# Patient Record
Sex: FEMALE | Race: WHITE
[De-identification: names, ages, dates, MRNs, and addresses within clinical notes are randomized per-mention and may not be internally consistent; named-entity substitution may affect disease eponyms.]

---

## 2018-01-12 ENCOUNTER — RESULT REVIEW (OUTPATIENT)
Age: 83
End: 2018-01-12

## 2019-04-09 ENCOUNTER — RESULT REVIEW (OUTPATIENT)
Age: 84
End: 2019-04-09

## 2019-04-17 ENCOUNTER — RESULT REVIEW (OUTPATIENT)
Age: 84
End: 2019-04-17

## 2021-10-21 PROBLEM — Z00.00 ENCOUNTER FOR PREVENTIVE HEALTH EXAMINATION: Status: ACTIVE | Noted: 2021-10-21

## 2021-10-29 ENCOUNTER — APPOINTMENT (OUTPATIENT)
Dept: OTOLARYNGOLOGY | Facility: CLINIC | Age: 86
End: 2021-10-29
Payer: MEDICARE

## 2021-10-29 VITALS — HEIGHT: 67 IN | WEIGHT: 165 LBS | BODY MASS INDEX: 25.9 KG/M2

## 2021-10-29 DIAGNOSIS — H90.3 SENSORINEURAL HEARING LOSS, BILATERAL: ICD-10-CM

## 2021-10-29 DIAGNOSIS — H93.8X9 OTHER SPECIFIED DISORDERS OF EAR, UNSPECIFIED EAR: ICD-10-CM

## 2021-10-29 PROCEDURE — 92550 TYMPANOMETRY & REFLEX THRESH: CPT

## 2021-10-29 PROCEDURE — 99203 OFFICE O/P NEW LOW 30 MIN: CPT | Mod: 25

## 2021-10-29 PROCEDURE — 92557 COMPREHENSIVE HEARING TEST: CPT

## 2021-10-29 NOTE — PHYSICAL EXAM
[Midline] : trachea located in midline position [Normal] : no rashes [de-identified] : narrow canals bilaterally

## 2021-10-29 NOTE — HISTORY OF PRESENT ILLNESS
[de-identified] : Patient presents today with c/o hearing loss. Patient admits lost hearing in the right ear [place]. Patient admits hearing loss worsening in the left ear after taking flight in September 2021. Patient very frustrated that she cant hear people anymore or understand them. 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 She tried hearing aid but did not help her.

## 2021-10-29 NOTE — ASSESSMENT
[FreeTextEntry1] : I reviewed, interpreted, and discussed the Audiogram done today. Bilateral SNHL. \par

## 2021-11-02 ENCOUNTER — APPOINTMENT (OUTPATIENT)
Dept: OTOLARYNGOLOGY | Facility: CLINIC | Age: 86
End: 2021-11-02
Payer: SELF-PAY

## 2021-11-02 PROCEDURE — V5299A: CUSTOM | Mod: NC

## 2022-03-17 ENCOUNTER — APPOINTMENT (OUTPATIENT)
Dept: OTOLARYNGOLOGY | Facility: CLINIC | Age: 87
End: 2022-03-17
Payer: SELF-PAY

## 2022-03-17 PROCEDURE — V5010 ASSESSMENT FOR HEARING AID: CPT

## 2022-03-23 ENCOUNTER — APPOINTMENT (OUTPATIENT)
Dept: OTOLARYNGOLOGY | Facility: CLINIC | Age: 87
End: 2022-03-23
Payer: MEDICARE

## 2022-03-23 PROCEDURE — V5010 ASSESSMENT FOR HEARING AID: CPT

## 2022-03-23 PROCEDURE — V5261G: CUSTOM

## 2022-04-22 ENCOUNTER — APPOINTMENT (OUTPATIENT)
Dept: OTOLARYNGOLOGY | Facility: CLINIC | Age: 87
End: 2022-04-22
Payer: SELF-PAY

## 2022-04-22 PROCEDURE — V5299A: CUSTOM | Mod: NC

## 2022-11-04 ENCOUNTER — APPOINTMENT (OUTPATIENT)
Dept: OTOLARYNGOLOGY | Facility: CLINIC | Age: 87
End: 2022-11-04

## 2022-11-04 PROCEDURE — V5299A: CUSTOM | Mod: NC

## 2022-11-20 ENCOUNTER — EMERGENCY (EMERGENCY)
Facility: HOSPITAL | Age: 87
LOS: 0 days | Discharge: HOME | End: 2022-11-20
Attending: EMERGENCY MEDICINE | Admitting: EMERGENCY MEDICINE

## 2022-11-20 VITALS
OXYGEN SATURATION: 99 % | HEART RATE: 66 BPM | TEMPERATURE: 98 F | SYSTOLIC BLOOD PRESSURE: 182 MMHG | DIASTOLIC BLOOD PRESSURE: 87 MMHG | RESPIRATION RATE: 18 BRPM

## 2022-11-20 DIAGNOSIS — R35.0 FREQUENCY OF MICTURITION: ICD-10-CM

## 2022-11-20 DIAGNOSIS — E78.5 HYPERLIPIDEMIA, UNSPECIFIED: ICD-10-CM

## 2022-11-20 DIAGNOSIS — R10.30 LOWER ABDOMINAL PAIN, UNSPECIFIED: ICD-10-CM

## 2022-11-20 DIAGNOSIS — N39.0 URINARY TRACT INFECTION, SITE NOT SPECIFIED: ICD-10-CM

## 2022-11-20 DIAGNOSIS — I10 ESSENTIAL (PRIMARY) HYPERTENSION: ICD-10-CM

## 2022-11-20 DIAGNOSIS — R30.0 DYSURIA: ICD-10-CM

## 2022-11-20 LAB
ALBUMIN SERPL ELPH-MCNC: 4 G/DL — SIGNIFICANT CHANGE UP (ref 3.5–5.2)
ALP SERPL-CCNC: 81 U/L — SIGNIFICANT CHANGE UP (ref 30–115)
ALT FLD-CCNC: 13 U/L — SIGNIFICANT CHANGE UP (ref 0–41)
ANION GAP SERPL CALC-SCNC: 11 MMOL/L — SIGNIFICANT CHANGE UP (ref 7–14)
APPEARANCE UR: ABNORMAL
AST SERPL-CCNC: 19 U/L — SIGNIFICANT CHANGE UP (ref 0–41)
BACTERIA # UR AUTO: ABNORMAL
BASOPHILS # BLD AUTO: 0.04 K/UL — SIGNIFICANT CHANGE UP (ref 0–0.2)
BASOPHILS NFR BLD AUTO: 0.5 % — SIGNIFICANT CHANGE UP (ref 0–1)
BILIRUB SERPL-MCNC: 0.4 MG/DL — SIGNIFICANT CHANGE UP (ref 0.2–1.2)
BILIRUB UR-MCNC: NEGATIVE — SIGNIFICANT CHANGE UP
BUN SERPL-MCNC: 14 MG/DL — SIGNIFICANT CHANGE UP (ref 10–20)
CALCIUM SERPL-MCNC: 9.7 MG/DL — SIGNIFICANT CHANGE UP (ref 8.4–10.5)
CHLORIDE SERPL-SCNC: 109 MMOL/L — SIGNIFICANT CHANGE UP (ref 98–110)
CO2 SERPL-SCNC: 27 MMOL/L — SIGNIFICANT CHANGE UP (ref 17–32)
COLOR SPEC: YELLOW — SIGNIFICANT CHANGE UP
CREAT SERPL-MCNC: 0.9 MG/DL — SIGNIFICANT CHANGE UP (ref 0.7–1.5)
DIFF PNL FLD: ABNORMAL
EGFR: 63 ML/MIN/1.73M2 — SIGNIFICANT CHANGE UP
EOSINOPHIL # BLD AUTO: 0.13 K/UL — SIGNIFICANT CHANGE UP (ref 0–0.7)
EOSINOPHIL NFR BLD AUTO: 1.6 % — SIGNIFICANT CHANGE UP (ref 0–8)
EPI CELLS # UR: 0 /HPF — SIGNIFICANT CHANGE UP (ref 0–5)
GLUCOSE SERPL-MCNC: 91 MG/DL — SIGNIFICANT CHANGE UP (ref 70–99)
GLUCOSE UR QL: NEGATIVE — SIGNIFICANT CHANGE UP
HCT VFR BLD CALC: 44.8 % — SIGNIFICANT CHANGE UP (ref 37–47)
HGB BLD-MCNC: 14.6 G/DL — SIGNIFICANT CHANGE UP (ref 12–16)
HYALINE CASTS # UR AUTO: 2 /LPF — SIGNIFICANT CHANGE UP (ref 0–7)
IMM GRANULOCYTES NFR BLD AUTO: 0.2 % — SIGNIFICANT CHANGE UP (ref 0.1–0.3)
KETONES UR-MCNC: NEGATIVE — SIGNIFICANT CHANGE UP
LEUKOCYTE ESTERASE UR-ACNC: ABNORMAL
LYMPHOCYTES # BLD AUTO: 1.9 K/UL — SIGNIFICANT CHANGE UP (ref 1.2–3.4)
LYMPHOCYTES # BLD AUTO: 22.9 % — SIGNIFICANT CHANGE UP (ref 20.5–51.1)
MCHC RBC-ENTMCNC: 30.2 PG — SIGNIFICANT CHANGE UP (ref 27–31)
MCHC RBC-ENTMCNC: 32.6 G/DL — SIGNIFICANT CHANGE UP (ref 32–37)
MCV RBC AUTO: 92.6 FL — SIGNIFICANT CHANGE UP (ref 81–99)
MONOCYTES # BLD AUTO: 0.63 K/UL — HIGH (ref 0.1–0.6)
MONOCYTES NFR BLD AUTO: 7.6 % — SIGNIFICANT CHANGE UP (ref 1.7–9.3)
NEUTROPHILS # BLD AUTO: 5.59 K/UL — SIGNIFICANT CHANGE UP (ref 1.4–6.5)
NEUTROPHILS NFR BLD AUTO: 67.2 % — SIGNIFICANT CHANGE UP (ref 42.2–75.2)
NITRITE UR-MCNC: POSITIVE
NRBC # BLD: 0 /100 WBCS — SIGNIFICANT CHANGE UP (ref 0–0)
PH UR: 7.5 — SIGNIFICANT CHANGE UP (ref 5–8)
PLATELET # BLD AUTO: 168 K/UL — SIGNIFICANT CHANGE UP (ref 130–400)
POTASSIUM SERPL-MCNC: 5.6 MMOL/L — HIGH (ref 3.5–5)
POTASSIUM SERPL-SCNC: 5.6 MMOL/L — HIGH (ref 3.5–5)
PROT SERPL-MCNC: 7.1 G/DL — SIGNIFICANT CHANGE UP (ref 6–8)
PROT UR-MCNC: SIGNIFICANT CHANGE UP
RBC # BLD: 4.84 M/UL — SIGNIFICANT CHANGE UP (ref 4.2–5.4)
RBC # FLD: 13.4 % — SIGNIFICANT CHANGE UP (ref 11.5–14.5)
RBC CASTS # UR COMP ASSIST: 7 /HPF — HIGH (ref 0–4)
SODIUM SERPL-SCNC: 147 MMOL/L — HIGH (ref 135–146)
SP GR SPEC: 1.01 — LOW (ref 1.01–1.03)
UROBILINOGEN FLD QL: SIGNIFICANT CHANGE UP
WBC # BLD: 8.31 K/UL — SIGNIFICANT CHANGE UP (ref 4.8–10.8)
WBC # FLD AUTO: 8.31 K/UL — SIGNIFICANT CHANGE UP (ref 4.8–10.8)
WBC UR QL: >720 /HPF — HIGH (ref 0–5)

## 2022-11-20 PROCEDURE — 74177 CT ABD & PELVIS W/CONTRAST: CPT | Mod: 26,MA

## 2022-11-20 PROCEDURE — 99285 EMERGENCY DEPT VISIT HI MDM: CPT

## 2022-11-20 RX ORDER — SODIUM CHLORIDE 9 MG/ML
1000 INJECTION, SOLUTION INTRAVENOUS ONCE
Refills: 0 | Status: COMPLETED | OUTPATIENT
Start: 2022-11-20 | End: 2022-11-20

## 2022-11-20 RX ORDER — CEFTRIAXONE 500 MG/1
1000 INJECTION, POWDER, FOR SOLUTION INTRAMUSCULAR; INTRAVENOUS ONCE
Refills: 0 | Status: COMPLETED | OUTPATIENT
Start: 2022-11-20 | End: 2022-11-20

## 2022-11-20 RX ORDER — ACETAMINOPHEN 500 MG
650 TABLET ORAL ONCE
Refills: 0 | Status: COMPLETED | OUTPATIENT
Start: 2022-11-20 | End: 2022-11-20

## 2022-11-20 RX ORDER — CEFPODOXIME PROXETIL 100 MG
1 TABLET ORAL
Qty: 14 | Refills: 0
Start: 2022-11-20 | End: 2022-11-26

## 2022-11-20 RX ADMIN — SODIUM CHLORIDE 1000 MILLILITER(S): 9 INJECTION, SOLUTION INTRAVENOUS at 13:39

## 2022-11-20 RX ADMIN — Medication 650 MILLIGRAM(S): at 13:39

## 2022-11-20 RX ADMIN — CEFTRIAXONE 100 MILLIGRAM(S): 500 INJECTION, POWDER, FOR SOLUTION INTRAMUSCULAR; INTRAVENOUS at 16:34

## 2022-11-20 NOTE — ED PROVIDER NOTE - PHYSICAL EXAMINATION
CONSTITUTIONAL: Well-developed; well-nourished; in no acute distress.   SKIN: warm, dry  HEAD: Normocephalic  EYES: no conjunctival injection.  CARD: S1, S2 normal; Regular rate and rhythm.   RESP: No wheezes, rales or rhonchi.  ABD: soft nd, mild suprapubic tenderness, no cva tenderness  EXT: Normal ROM.  No clubbing, cyanosis or edema.   NEURO: Alert, oriented, grossly unremarkable.  PSYCH: Cooperative, appropriate.

## 2022-11-20 NOTE — ED PROVIDER NOTE - PATIENT PORTAL LINK FT
You can access the FollowMyHealth Patient Portal offered by [facility] by registering at the following website: [email or web address]. By joining [employer]’s FollowMyHealth portal, you will also be able to view your health information using other applications (apps) compatible with our system.

## 2022-11-20 NOTE — ED PROVIDER NOTE - NS ED ROS FT
GEN:  no fever, no chills, no generalized weakness  NEURO:  no headache, no dizziness  ENT: no sore throat, no runny nose  CV:  no chest pain, no palpitations  RESP:  no sob, no cough  GI:  no nausea, no vomiting, +suprapubic and left flank pain, no diarrhea  :  +dysuria, +urinary frequency, no hematuria  MSK:  no joint pain, no edema  SKIN:  no rash, no bruising

## 2022-11-20 NOTE — ED PROVIDER NOTE - CLINICAL SUMMARY MEDICAL DECISION MAKING FREE TEXT BOX
Patient found to have UTI versus pyelonephritis, will DC home with p.o. antibiotics, patient afebrile tolerating p.o. well appearing, follow-up with PMD as outpatient 1 to 2 weeks, strict return precautions

## 2022-11-20 NOTE — ED ADULT NURSE NOTE - OBJECTIVE STATEMENT
87 year old female alert and oriented c/o lower abdominal pain and flank pain for last few weeks that has been getting worse, patient had sono out patient that showed gallstones, denies fevers, n/v/d. No s.s of distress noted.

## 2022-11-20 NOTE — ED PROVIDER NOTE - OBJECTIVE STATEMENT
88 yo female, PMHx of HTN, HLD, presents with dysuria x1 month, worse over time, no alleviating factors now associated with radiating pain suprapubic to left flank. Denies fevers, chills, nausea, vomiting, headache, dizziness, diarrhea, constipation.

## 2022-11-20 NOTE — ED PROVIDER NOTE - ATTENDING CONTRIBUTION TO CARE
87-year-old female past medical history hypertension hyperlipidemia, presents with 1 month of dysuria and frequency.  Had suprapubic discomfort associated which now radiates to the left flank since yesterday.  No hematuria.  No fever.  No nausea vomiting diarrhea.  No chest pain shortness of breath.    On exam, AFVSS, Well appearing, No acute distress, NCAT, EOMI, PERRLA, MMM, Neck supple, LCTAB, RRR nl s1s2 No mrg, Abdomen Soft NTND, left CVA tenderness, AAOx3, No Focal Deficits, No LE edema or calf TTP,    A/P; concern for UTI/Pyelo, rule out renal colic, labs UA CT reeval

## 2022-11-20 NOTE — ED PROVIDER NOTE - CARE PROVIDER_API CALL
Patient presents with c/o cough, congestion and upset stomach for \"quite awhile\".  States she has been tested for COVID and it was negative.  No fevers noted.   MICAH WILLIAMSON  Internal Medicine  [address] [place] [address]  Phone: [phone]  Fax: [phone]  Follow Up Time: 1-3 Days

## 2022-11-23 LAB
-  AMIKACIN: SIGNIFICANT CHANGE UP
-  AMOXICILLIN/CLAVULANIC ACID: SIGNIFICANT CHANGE UP
-  AMPICILLIN/SULBACTAM: SIGNIFICANT CHANGE UP
-  AMPICILLIN: SIGNIFICANT CHANGE UP
-  AZTREONAM: SIGNIFICANT CHANGE UP
-  CEFAZOLIN: SIGNIFICANT CHANGE UP
-  CEFEPIME: SIGNIFICANT CHANGE UP
-  CEFOXITIN: SIGNIFICANT CHANGE UP
-  CEFTRIAXONE: SIGNIFICANT CHANGE UP
-  CIPROFLOXACIN: SIGNIFICANT CHANGE UP
-  ERTAPENEM: SIGNIFICANT CHANGE UP
-  GENTAMICIN: SIGNIFICANT CHANGE UP
-  IMIPENEM: SIGNIFICANT CHANGE UP
-  LEVOFLOXACIN: SIGNIFICANT CHANGE UP
-  MEROPENEM: SIGNIFICANT CHANGE UP
-  NITROFURANTOIN: SIGNIFICANT CHANGE UP
-  PIPERACILLIN/TAZOBACTAM: SIGNIFICANT CHANGE UP
-  TOBRAMYCIN: SIGNIFICANT CHANGE UP
-  TRIMETHOPRIM/SULFAMETHOXAZOLE: SIGNIFICANT CHANGE UP
CULTURE RESULTS: SIGNIFICANT CHANGE UP
METHOD TYPE: SIGNIFICANT CHANGE UP
ORGANISM # SPEC MICROSCOPIC CNT: SIGNIFICANT CHANGE UP
ORGANISM # SPEC MICROSCOPIC CNT: SIGNIFICANT CHANGE UP
SPECIMEN SOURCE: SIGNIFICANT CHANGE UP

## 2022-12-20 ENCOUNTER — APPOINTMENT (OUTPATIENT)
Dept: UROLOGY | Facility: CLINIC | Age: 87
End: 2022-12-20

## 2022-12-20 VITALS — WEIGHT: 150 LBS | BODY MASS INDEX: 24.99 KG/M2 | HEIGHT: 65 IN

## 2022-12-20 DIAGNOSIS — E78.00 PURE HYPERCHOLESTEROLEMIA, UNSPECIFIED: ICD-10-CM

## 2022-12-20 DIAGNOSIS — I10 ESSENTIAL (PRIMARY) HYPERTENSION: ICD-10-CM

## 2022-12-20 DIAGNOSIS — N28.1 CYST OF KIDNEY, ACQUIRED: ICD-10-CM

## 2022-12-20 DIAGNOSIS — Z78.9 OTHER SPECIFIED HEALTH STATUS: ICD-10-CM

## 2022-12-20 LAB
BILIRUB UR QL STRIP: NORMAL
COLLECTION METHOD: NORMAL
GLUCOSE UR-MCNC: NORMAL
HCG UR QL: 0.2 EU/DL
HGB UR QL STRIP.AUTO: NORMAL
KETONES UR-MCNC: NORMAL
LEUKOCYTE ESTERASE UR QL STRIP: NORMAL
NITRITE UR QL STRIP: NORMAL
PH UR STRIP: 5.5
PROT UR STRIP-MCNC: NORMAL
SP GR UR STRIP: 1.02

## 2022-12-20 PROCEDURE — 81003 URINALYSIS AUTO W/O SCOPE: CPT | Mod: QW

## 2022-12-20 PROCEDURE — 99203 OFFICE O/P NEW LOW 30 MIN: CPT

## 2022-12-20 RX ORDER — PRAVASTATIN SODIUM 10 MG/1
10 TABLET ORAL
Refills: 0 | Status: ACTIVE | COMMUNITY

## 2022-12-20 RX ORDER — METOPROLOL TARTRATE 75 MG/1
TABLET, FILM COATED ORAL
Refills: 0 | Status: ACTIVE | COMMUNITY

## 2022-12-20 NOTE — ASSESSMENT
[FreeTextEntry1] : 86 y/o female who presents with bilateral renal cysts on US. I have reviewed the US entirely. She also complains of some incomplete emptying, has recurrent culture proven UTIs, and had a fall back in September. We discussed these issues in some detail. I asked her to follow up with a cystoscopy and Uroflow with PVR to further w/u the UTI issues. An US can be repeated in 6 mo to 1 year. All her questions were otherwise answered.Total time spent was 35 min.\par \par The submitted E/M billing level for this visit reflects the total time spent on the day of the visit including face-to-face time spent with the patient, non-face-to-face review of medical records and relevant information, documentation, and asynchronous communication with the patient after a visit via phone, email, or patient’s EHR portal after the visit. \par The medical records reviewed are either scanned into the chart or reviewed with the patient using a patient’s electronic medical records portal for patients with records not available to [facility] via electronic transmission platforms from other institutions and labs. \par Time spend counseling and performing coordination of care was also included in determining the appropriate EM billing level.\par \par I have reviewed and verified information regarding the chief complaint and history recorded by the ancillary staff and/or the patient. I have independently reviewed and interpreted tests performed by other physicians and facilities as necessary. \par \par I have discussed with the patient differential diagnosis, reason for auxiliary tests if ordered, risks, benefits, alternatives, and complications of each form of therapy were discussed.\par

## 2022-12-20 NOTE — HISTORY OF PRESENT ILLNESS
[FreeTextEntry1] : 88 y/o female who presented with bilateral renal cysts. She does have some left flank/back pain but does admit that this started after a fall in September. She complains of some recurrent UTIs. According to the pt, she had an infection over the summer, but we do not have those results. She does have a positive culture for e.coli this past November. During the infections, pt feels that she has suprapubic pain and discomfort. She denies any gross hematuria or a hx of stones. She does feel that she does not empty completely at times and has nocturia x1. Pt has normal bowel movements. She was seen accompanied by her daughter today. \par \par US 11/10/22:\par - Right kidney: newly visualized cystic nodule involving the lower pole measuring 2.5 x 1.7 x 2.2 cm\par - Left kidney: newly visualized septated cyst involving the lower pole. measuring 1.5 x 1.1 x 1.cm\par

## 2022-12-22 LAB — BACTERIA UR CULT: NORMAL

## 2023-01-05 ENCOUNTER — APPOINTMENT (OUTPATIENT)
Dept: OTOLARYNGOLOGY | Facility: CLINIC | Age: 88
End: 2023-01-05
Payer: MEDICARE

## 2023-01-05 PROCEDURE — V5299A: CUSTOM | Mod: NC

## 2023-02-07 ENCOUNTER — EMERGENCY (EMERGENCY)
Facility: HOSPITAL | Age: 88
LOS: 0 days | Discharge: LEFT BEFORE DISCHARGE | End: 2023-02-07
Attending: EMERGENCY MEDICINE
Payer: MEDICARE

## 2023-02-07 VITALS
SYSTOLIC BLOOD PRESSURE: 210 MMHG | RESPIRATION RATE: 18 BRPM | OXYGEN SATURATION: 99 % | TEMPERATURE: 98 F | WEIGHT: 156.97 LBS | HEART RATE: 72 BPM | DIASTOLIC BLOOD PRESSURE: 112 MMHG | HEIGHT: 66 IN

## 2023-02-07 DIAGNOSIS — M54.9 DORSALGIA, UNSPECIFIED: ICD-10-CM

## 2023-02-07 DIAGNOSIS — R30.0 DYSURIA: ICD-10-CM

## 2023-02-07 DIAGNOSIS — R35.0 FREQUENCY OF MICTURITION: ICD-10-CM

## 2023-02-07 DIAGNOSIS — I10 ESSENTIAL (PRIMARY) HYPERTENSION: ICD-10-CM

## 2023-02-07 DIAGNOSIS — R10.30 LOWER ABDOMINAL PAIN, UNSPECIFIED: ICD-10-CM

## 2023-02-07 LAB
ALBUMIN SERPL ELPH-MCNC: 4.1 G/DL — SIGNIFICANT CHANGE UP (ref 3.5–5.2)
ALP SERPL-CCNC: 80 U/L — SIGNIFICANT CHANGE UP (ref 30–115)
ALT FLD-CCNC: 11 U/L — SIGNIFICANT CHANGE UP (ref 0–41)
ANION GAP SERPL CALC-SCNC: 10 MMOL/L — SIGNIFICANT CHANGE UP (ref 7–14)
APPEARANCE UR: ABNORMAL
AST SERPL-CCNC: 22 U/L — SIGNIFICANT CHANGE UP (ref 0–41)
BACTERIA # UR AUTO: NEGATIVE — SIGNIFICANT CHANGE UP
BASOPHILS # BLD AUTO: 0.03 K/UL — SIGNIFICANT CHANGE UP (ref 0–0.2)
BASOPHILS NFR BLD AUTO: 0.4 % — SIGNIFICANT CHANGE UP (ref 0–1)
BILIRUB SERPL-MCNC: 0.3 MG/DL — SIGNIFICANT CHANGE UP (ref 0.2–1.2)
BILIRUB UR-MCNC: NEGATIVE — SIGNIFICANT CHANGE UP
BUN SERPL-MCNC: 18 MG/DL — SIGNIFICANT CHANGE UP (ref 10–20)
CALCIUM SERPL-MCNC: 9.5 MG/DL — SIGNIFICANT CHANGE UP (ref 8.4–10.5)
CHLORIDE SERPL-SCNC: 102 MMOL/L — SIGNIFICANT CHANGE UP (ref 98–110)
CO2 SERPL-SCNC: 24 MMOL/L — SIGNIFICANT CHANGE UP (ref 17–32)
COLOR SPEC: YELLOW — SIGNIFICANT CHANGE UP
CREAT SERPL-MCNC: 0.8 MG/DL — SIGNIFICANT CHANGE UP (ref 0.7–1.5)
DIFF PNL FLD: ABNORMAL
EGFR: 72 ML/MIN/1.73M2 — SIGNIFICANT CHANGE UP
EOSINOPHIL # BLD AUTO: 0.17 K/UL — SIGNIFICANT CHANGE UP (ref 0–0.7)
EOSINOPHIL NFR BLD AUTO: 2.5 % — SIGNIFICANT CHANGE UP (ref 0–8)
EPI CELLS # UR: 0 /HPF — SIGNIFICANT CHANGE UP (ref 0–5)
GLUCOSE SERPL-MCNC: 123 MG/DL — HIGH (ref 70–99)
GLUCOSE UR QL: NEGATIVE — SIGNIFICANT CHANGE UP
HCT VFR BLD CALC: 42.1 % — SIGNIFICANT CHANGE UP (ref 37–47)
HGB BLD-MCNC: 13.8 G/DL — SIGNIFICANT CHANGE UP (ref 12–16)
HYALINE CASTS # UR AUTO: 0 /LPF — SIGNIFICANT CHANGE UP (ref 0–7)
IMM GRANULOCYTES NFR BLD AUTO: 0.3 % — SIGNIFICANT CHANGE UP (ref 0.1–0.3)
KETONES UR-MCNC: NEGATIVE — SIGNIFICANT CHANGE UP
LEUKOCYTE ESTERASE UR-ACNC: ABNORMAL
LYMPHOCYTES # BLD AUTO: 2.68 K/UL — SIGNIFICANT CHANGE UP (ref 1.2–3.4)
LYMPHOCYTES # BLD AUTO: 38.8 % — SIGNIFICANT CHANGE UP (ref 20.5–51.1)
MCHC RBC-ENTMCNC: 29.6 PG — SIGNIFICANT CHANGE UP (ref 27–31)
MCHC RBC-ENTMCNC: 32.8 G/DL — SIGNIFICANT CHANGE UP (ref 32–37)
MCV RBC AUTO: 90.3 FL — SIGNIFICANT CHANGE UP (ref 81–99)
MONOCYTES # BLD AUTO: 0.5 K/UL — SIGNIFICANT CHANGE UP (ref 0.1–0.6)
MONOCYTES NFR BLD AUTO: 7.2 % — SIGNIFICANT CHANGE UP (ref 1.7–9.3)
NEUTROPHILS # BLD AUTO: 3.5 K/UL — SIGNIFICANT CHANGE UP (ref 1.4–6.5)
NEUTROPHILS NFR BLD AUTO: 50.8 % — SIGNIFICANT CHANGE UP (ref 42.2–75.2)
NITRITE UR-MCNC: NEGATIVE — SIGNIFICANT CHANGE UP
NRBC # BLD: 0 /100 WBCS — SIGNIFICANT CHANGE UP (ref 0–0)
PH UR: 6.5 — SIGNIFICANT CHANGE UP (ref 5–8)
PLATELET # BLD AUTO: 180 K/UL — SIGNIFICANT CHANGE UP (ref 130–400)
POTASSIUM SERPL-MCNC: 4.6 MMOL/L — SIGNIFICANT CHANGE UP (ref 3.5–5)
POTASSIUM SERPL-SCNC: 4.6 MMOL/L — SIGNIFICANT CHANGE UP (ref 3.5–5)
PROT SERPL-MCNC: 7.2 G/DL — SIGNIFICANT CHANGE UP (ref 6–8)
PROT UR-MCNC: SIGNIFICANT CHANGE UP
RBC # BLD: 4.66 M/UL — SIGNIFICANT CHANGE UP (ref 4.2–5.4)
RBC # FLD: 12.9 % — SIGNIFICANT CHANGE UP (ref 11.5–14.5)
RBC CASTS # UR COMP ASSIST: 1 /HPF — SIGNIFICANT CHANGE UP (ref 0–4)
SODIUM SERPL-SCNC: 136 MMOL/L — SIGNIFICANT CHANGE UP (ref 135–146)
SP GR SPEC: 1 — LOW (ref 1.01–1.03)
UROBILINOGEN FLD QL: SIGNIFICANT CHANGE UP
WBC # BLD: 6.9 K/UL — SIGNIFICANT CHANGE UP (ref 4.8–10.8)
WBC # FLD AUTO: 6.9 K/UL — SIGNIFICANT CHANGE UP (ref 4.8–10.8)
WBC UR QL: >720 /HPF — HIGH (ref 0–5)

## 2023-02-07 PROCEDURE — 99284 EMERGENCY DEPT VISIT MOD MDM: CPT

## 2023-02-07 PROCEDURE — 99283 EMERGENCY DEPT VISIT LOW MDM: CPT

## 2023-02-07 PROCEDURE — 80053 COMPREHEN METABOLIC PANEL: CPT

## 2023-02-07 PROCEDURE — 85025 COMPLETE CBC W/AUTO DIFF WBC: CPT

## 2023-02-07 PROCEDURE — 87186 SC STD MICRODIL/AGAR DIL: CPT

## 2023-02-07 PROCEDURE — 81001 URINALYSIS AUTO W/SCOPE: CPT

## 2023-02-07 PROCEDURE — 87086 URINE CULTURE/COLONY COUNT: CPT

## 2023-02-07 PROCEDURE — 36415 COLL VENOUS BLD VENIPUNCTURE: CPT

## 2023-02-07 RX ORDER — ACETAMINOPHEN 500 MG
975 TABLET ORAL ONCE
Refills: 0 | Status: COMPLETED | OUTPATIENT
Start: 2023-02-07 | End: 2023-02-07

## 2023-02-07 RX ORDER — CEFPODOXIME PROXETIL 100 MG
1 TABLET ORAL
Qty: 20 | Refills: 0
Start: 2023-02-07 | End: 2023-02-16

## 2023-02-07 RX ADMIN — Medication 975 MILLIGRAM(S): at 18:49

## 2023-02-07 NOTE — ED PROVIDER NOTE - PROGRESS NOTE DETAILS
TN - pt ambulating around nurses station asking to leave. pt a&ox4; pt does not want to wait for ct or results. The patient wishes to leave against medical advice.  I have discussed the risks, benefits and alternatives (including the possibility of worsening of disease, pain, permanent disability, and/or death) with the patient and his/her family (if available).  The patient voices understanding of these risks, benefits, and alternatives and still wishes to sign out against medical advice.  The patient is awake, alert, oriented  x 3 and has demonstrated capacity to refuse/direct care.  I have advised the patient that they can and should return immediately should they develop any worse/different/additional symptoms, or if they change their mind and want to continue their care. TN - IV placed and blood drawn. pt c/o that the IV hurts and that normally it does not hurt to get an IV placed. no erythema or edema; IV flushes properly; blood aspirated w/o complicaiton. pt refused POCUS exam for kidney bladder

## 2023-02-07 NOTE — ED PROVIDER NOTE - PATIENT PORTAL LINK FT
You can access the FollowMyHealth Patient Portal offered by [facility] by registering at the following website: [email or web address]. By joining [employer]’s FollowMyHealth portal, you will also be able to view your health information using other applications (apps) compatible with our system.

## 2023-02-07 NOTE — ED PROVIDER NOTE - PHYSICAL EXAMINATION
CONSTITUTIONAL: nontoxic appearing, in no acute distress  HEAD:  normocephalic, atraumatic  EYES:  no conjunctival injection, no eye discharge, tracking well  ENT:  tympanic membranes intact bilaterally, moist mucous membranes, no oropharyngeal ulcerations or lesions, no tonsillar swelling or erythema, no tonsillar exudates  NECK:  supple, no masses, no tender anterior/posterior cervical lymphadenopathy  CV:  regular rate and rhythm, cap refill < 2 seconds  RESP:  normal respiratory effort, lungs clear to auscultation bilaterally, no wheezes, no crackles, no retractions, no stridor  ABD:  soft, nontender, nondistended, no masses, no organomegaly, +ttp lower abd, mild   LYMPH:  no significant lymphadenopathy  MSK/NEURO:  normal movement, normal tone  SKIN:  warm, dry, no rash

## 2023-02-07 NOTE — ED PROVIDER NOTE - NSFOLLOWUPCLINICS_GEN_ALL_ED_FT
[facility]  Medicine  [address], [place]   Phone: [phone]  Fax:

## 2023-02-07 NOTE — ED PROVIDER NOTE - OBJECTIVE STATEMENT
88-year-old female past medical history of hypertension and complaints of urinary symptoms.  Patient was seen here in December for similar complications for which at that time she was worked up with blood work and CAT scan that were negative.  Patient treated for UTI, states that after antibiotics had no urinary symptoms.  Patient presents again today with 3 days of similar symptoms with burning on urination, lower abdominal pain.  Denies fever, nausea vomiting, shortness of breath, chest pain, back pain, paresthesias.

## 2023-02-07 NOTE — ED PROVIDER NOTE - CLINICAL SUMMARY MEDICAL DECISION MAKING FREE TEXT BOX
88-year-old female with history of HTN, in ER c/o urinary symptoms for the past 3 days.  + Dysuria, frequency.  + Lower abdominal pain and back pain.  No F/C.  No N/V/D.  No CP/SOB.  No HA/dizziness/syncope.  Patient had similar symptoms 11/2022, seen in ER, treated with antibiotics with resolution of symptoms.  PE - nad, nc/at, eomi, perrl, op - clear, mmm, neck supple, cta b/l, no w/r/r, rrr, abd- soft, + lower abd tenderness, no cvat, nabs, from x 4, no LE swelling/tenderness, A&O x 3, cn 2-12 intact, no focal motor/sensory deficits.   -labs/ua sent, CT abd ordered, however patient did not want to wait for results of testing, did not want to wait for CT scan.  Requesting to leave AMA.  Patient aware that work-up incomplete, and that leaving could result in death or permanent disability.  Patient understands, but still wants to go.  To sign out AMA.  Patient told she can return to ER at any time to continue her evaluation, told to follow-up with PMD, she should return to ER if she feels worse, or for any new/concerning symptoms.  Rx for antibiotics sent to patient's pharmacy.  Attempted to call patient at home after discharge for follow-up, however no answer on patient's phone listed in chart.

## 2023-02-16 ENCOUNTER — APPOINTMENT (OUTPATIENT)
Dept: OTOLARYNGOLOGY | Facility: CLINIC | Age: 88
End: 2023-02-16
Payer: SELF-PAY

## 2023-02-16 PROCEDURE — V5299A: CUSTOM | Mod: NC

## 2023-02-17 ENCOUNTER — NON-APPOINTMENT (OUTPATIENT)
Age: 88
End: 2023-02-17

## 2023-02-17 ENCOUNTER — APPOINTMENT (OUTPATIENT)
Dept: UROLOGY | Facility: CLINIC | Age: 88
End: 2023-02-17
Payer: MEDICARE

## 2023-02-17 VITALS
RESPIRATION RATE: 16 BRPM | HEART RATE: 75 BPM | TEMPERATURE: 97.3 F | DIASTOLIC BLOOD PRESSURE: 72 MMHG | OXYGEN SATURATION: 98 % | HEIGHT: 65 IN | BODY MASS INDEX: 24.99 KG/M2 | WEIGHT: 150 LBS | SYSTOLIC BLOOD PRESSURE: 121 MMHG

## 2023-02-17 DIAGNOSIS — R10.9 UNSPECIFIED ABDOMINAL PAIN: ICD-10-CM

## 2023-02-17 LAB
BILIRUB UR QL STRIP: NORMAL
COLLECTION METHOD: NORMAL
GLUCOSE UR-MCNC: NORMAL
HCG UR QL: 0.2 EU/DL
HGB UR QL STRIP.AUTO: NORMAL
KETONES UR-MCNC: NORMAL
LEUKOCYTE ESTERASE UR QL STRIP: NORMAL
NITRITE UR QL STRIP: POSITIVE
PH UR STRIP: 5.5
PROT UR STRIP-MCNC: NORMAL
SP GR UR STRIP: 1.01

## 2023-02-17 PROCEDURE — 81003 URINALYSIS AUTO W/O SCOPE: CPT | Mod: QW

## 2023-02-17 PROCEDURE — 99213 OFFICE O/P EST LOW 20 MIN: CPT

## 2023-02-17 RX ORDER — SULFAMETHOXAZOLE AND TRIMETHOPRIM 800; 160 MG/1; MG/1
800-160 TABLET ORAL
Qty: 14 | Refills: 0 | Status: ACTIVE | COMMUNITY
Start: 2023-02-17 | End: 1900-01-01

## 2023-02-17 NOTE — PHYSICAL EXAM
[General Appearance - Well Nourished] : well nourished [Normal Appearance] : normal appearance [General Appearance - In No Acute Distress] : no acute distress [Oriented To Time, Place, And Person] : oriented to person, place, and time [Affect] : the affect was normal [Costovertebral Angle Tenderness] : no ~M costovertebral angle tenderness

## 2023-02-20 PROBLEM — R10.9 FLANK PAIN: Status: ACTIVE | Noted: 2023-02-20

## 2023-02-20 NOTE — HISTORY OF PRESENT ILLNESS
[FreeTextEntry1] : 89 y/o female with a hx of recurrent UTIs who was to have a cystoscopy today, but her UA was positive for nitrites with moderate leukocytes. Her cystoscopy was postponed due to this. She still complains of some left sided discomfort. I have reviewed the pt's CT and it did not show any stones or hydronephrosis that would be causing this discomfort. She does have some bowel and sigmoid issues--her pain could be related to this.

## 2023-02-20 NOTE — ASSESSMENT
[FreeTextEntry1] : 89 y/o female with a hx of recurrent UTIs and sigmoid issues who was supposed to have a cystoscopy today. Unfortunately, her UA was nitrite positive and did show moderate leukocytes and thus, her cystoscopy was postponed. I've given the pt a prescription for Bactrim or Septra double strength BID for 7 days. He urine was sent for culture, once those results are available, we will adjust the antibiotics as needed. We will keep her on low dose antibiotics until her next cystoscopy. She may take Pyridium or AZO as needed. Pt understands and all her questions were otherwise answered. Total time=20 min.\par \par The submitted E/M billing level for this visit reflects the total time spent on the day of the visit including face-to-face time spent with the patient, non-face-to-face review of medical records and relevant information, documentation, and asynchronous communication with the patient after a visit via phone, email, or patient’s EHR portal after the visit. \par The medical records reviewed are either scanned into the chart or reviewed with the patient using a patient’s electronic medical records portal for patients with records not available to [facility] via electronic transmission platforms from other institutions and labs. \par Time spend counseling and performing coordination of care was also included in determining the appropriate EM billing level.\par \par I have reviewed and verified information regarding the chief complaint and history recorded by the ancillary staff and/or the patient. I have independently reviewed and interpreted tests performed by other physicians and facilities as necessary. \par \par I have discussed with the patient differential diagnosis, reason for auxiliary tests if ordered, risks, benefits, alternatives, and complications of each form of therapy were discussed.\par

## 2023-02-22 RX ORDER — CEPHALEXIN 500 MG/1
500 CAPSULE ORAL EVERY 8 HOURS
Qty: 21 | Refills: 0 | Status: ACTIVE | COMMUNITY
Start: 2023-02-22 | End: 1900-01-01

## 2023-02-23 ENCOUNTER — APPOINTMENT (OUTPATIENT)
Dept: OTOLARYNGOLOGY | Facility: CLINIC | Age: 88
End: 2023-02-23
Payer: SELF-PAY

## 2023-02-23 PROCEDURE — V5299A: CUSTOM | Mod: NC

## 2023-02-24 ENCOUNTER — NON-APPOINTMENT (OUTPATIENT)
Age: 88
End: 2023-02-24

## 2023-02-24 LAB — BACTERIA UR CULT: ABNORMAL

## 2023-02-24 RX ORDER — CEPHALEXIN 250 MG/1
250 CAPSULE ORAL DAILY
Qty: 30 | Refills: 1 | Status: ACTIVE | COMMUNITY
Start: 2023-02-24 | End: 1900-01-01

## 2023-03-28 ENCOUNTER — APPOINTMENT (OUTPATIENT)
Dept: UROLOGY | Facility: CLINIC | Age: 88
End: 2023-03-28
Payer: MEDICARE

## 2023-03-28 VITALS
HEIGHT: 65 IN | HEART RATE: 74 BPM | DIASTOLIC BLOOD PRESSURE: 71 MMHG | TEMPERATURE: 97.6 F | WEIGHT: 150 LBS | BODY MASS INDEX: 24.99 KG/M2 | RESPIRATION RATE: 16 BRPM | SYSTOLIC BLOOD PRESSURE: 118 MMHG | OXYGEN SATURATION: 98 %

## 2023-03-28 DIAGNOSIS — N39.0 URINARY TRACT INFECTION, SITE NOT SPECIFIED: ICD-10-CM

## 2023-03-28 PROCEDURE — 52000 CYSTOURETHROSCOPY: CPT

## 2023-04-10 ENCOUNTER — APPOINTMENT (OUTPATIENT)
Dept: OTOLARYNGOLOGY | Facility: CLINIC | Age: 88
End: 2023-04-10
Payer: SELF-PAY

## 2023-04-10 PROCEDURE — V5299A: CUSTOM | Mod: NC

## 2023-10-05 NOTE — ED ADULT TRIAGE NOTE - BP NONINVASIVE SYSTOLIC (MM HG)
Takes 8 units in the morning and 5 units at night  Patient does not always test her blood sugars even though she was educated on the importance of taking her blood sugars  7/23 GFR:  45, Cr 1.10, BUN 19-admits she does not drink enough  7/23 HgA1C: 8.6 which has slightly increased from last years of 7.3  todays blood glucose was 182   Unclear if she takes her bedtime insulin she admits sometimes she forgets.  Agrees that she has not been sticking to her diet  Continues to loose weight because appetite is poor   210

## 2023-10-25 ENCOUNTER — APPOINTMENT (OUTPATIENT)
Dept: UROLOGY | Facility: CLINIC | Age: 88
End: 2023-10-25

## 2023-11-08 NOTE — ED ADULT TRIAGE NOTE - IDEAL BODY WEIGHT(KG)
Medication History  [facility]    Patient Name: Juan Manuel Layton 1953     Medication history has been completed by: Sincere Rees CPhT    Source(s) of information: patient's caregiver and insurance claims     Primary Care Physician: Marissa Pozo MD     Pharmacy: [facility]    Allergies as of 11/08/2023 - Fully Reviewed 11/08/2023   Allergen Reaction Noted    Erythromycin  09/13/2011    Lorazepam Itching 10/11/2011    Zolpidem Itching 10/11/2011    Adhesive tape Rash 03/17/2014    Macrolides and ketolides Rash 04/23/2020        Prior to Admission medications    Medication Sig Start Date End Date Taking? Authorizing Provider   Cholecalciferol (VITAMIN D) 50 MCG (2000 UT) CAPS capsule Take 2,000 Units by mouth daily   Yes ProviderSung MD   vitamin A 3 MG (52190 UT) capsule Take 1 capsule by mouth daily   Yes Provider, MD Sung   dronabinol (MARINOL) 2.5 MG capsule Take 1 capsule by mouth 2 times daily (before meals) for 30 days. Max Daily Amount: 5 mg 11/7/23 12/7/23  Sugey Ulrich MD   mirtazapine (REMERON) 15 MG tablet Take 1 tablet by mouth nightly 11/1/23   Sugey Ulrich MD   pantoprazole (PROTONIX) 40 MG tablet Take 1 tablet by mouth 2 times daily 10/8/23   Bridgette Novak MD   ondansetron (ZOFRAN) 8 MG tablet Take 1 tablet by mouth every 8 hours as needed for Nausea or Vomiting Take 1 tablet by mouth every 8 hours as needed for nausea or vomiting. 10/2/23   Sugey Ulrich MD   promethazine (PHENERGAN) 25 MG tablet Take 1 tablet by mouth every 6 hours as needed for Nausea 10/2/23   Sugey Ulrich MD   OLANZapine (ZYPREXA) 2.5 MG tablet Take 1 tablet by mouth nightly 10/2/23   Sugey Ulrich MD   dexamethasone (DECADRON) 4 MG tablet Take 1 tablet by mouth in the morning with food for 2 days after the first chemo treatment.  Then take 2 tablets by mouth in the morning with food the morning before each treatment and for 2 days after each treatment 10/2/23   Anupam Johnson 61

## 2023-12-12 ENCOUNTER — OUTPATIENT (OUTPATIENT)
Dept: OUTPATIENT SERVICES | Facility: HOSPITAL | Age: 88
LOS: 1 days | Discharge: HOME | End: 2023-12-12
Payer: MEDICARE

## 2023-12-12 VITALS
WEIGHT: 151.02 LBS | HEART RATE: 83 BPM | OXYGEN SATURATION: 98 % | RESPIRATION RATE: 17 BRPM | HEIGHT: 65 IN | TEMPERATURE: 96 F | SYSTOLIC BLOOD PRESSURE: 178 MMHG | DIASTOLIC BLOOD PRESSURE: 86 MMHG

## 2023-12-12 VITALS — SYSTOLIC BLOOD PRESSURE: 180 MMHG | HEART RATE: 80 BPM | DIASTOLIC BLOOD PRESSURE: 79 MMHG | RESPIRATION RATE: 17 BRPM

## 2023-12-12 DIAGNOSIS — Z98.890 OTHER SPECIFIED POSTPROCEDURAL STATES: Chronic | ICD-10-CM

## 2023-12-12 DIAGNOSIS — H25.11 AGE-RELATED NUCLEAR CATARACT, RIGHT EYE: ICD-10-CM

## 2023-12-12 PROCEDURE — V2632: CPT

## 2023-12-12 NOTE — CHART NOTE - NSCHARTNOTEFT_GEN_A_CORE
PACU ANESTHESIA ADMISSION NOTE      Procedure:   Post op diagnosis:      ____  Intubated  TV:______       Rate: ______      FiO2: ______    _x___  Patent Airway    _x___  Full return of protective reflexes    _x___  Full recove  Mental Status:  _x___ Awake   _____ Alert   _____ Drowsy   _____ Sedated    Nausea/Vomiting:  _x___  NO       ______Yes,   See Post - Op Orders         VS    BP  167/65  P  63  R  14  Sat  100    Pain Scale (0-10):  __0___    Treatment: _x___ None    ____ See Post - Op/PCA Orders    Post - Operative Fluids:   __x__ Oral   ____ See Post - Op Orders    Plan: Discharge:   _x___Home       _____Floor     _____Critical Care    _____  Other:_________________    Comments:  No anesthesia issues or complications noted.  Discharge when criteria met.

## 2023-12-12 NOTE — ASU PATIENT PROFILE, ADULT - FALL HARM RISK - UNIVERSAL INTERVENTIONS
Bed in lowest position, wheels locked, appropriate side rails in place/Call bell, personal items and telephone in reach/Instruct patient to call for assistance before getting out of bed or chair/Non-slip footwear when patient is out of bed/[place] to call system/Physically safe environment - no spills, clutter or unnecessary equipment/Purposeful Proactive Rounding/Room/bathroom lighting operational, light cord in reach Bed in lowest position, wheels locked, appropriate side rails in place/Call bell, personal items and telephone in reach/Instruct patient to call for assistance before getting out of bed or chair/Non-slip footwear when patient is out of bed/[place] to call system/Physically safe environment - no spills, clutter or unnecessary equipment/Purposeful Proactive Rounding/Room/bathroom lighting operational, light cord in reach

## 2023-12-12 NOTE — ASU PREOP CHECKLIST - INTERNAL PROSTHESES
no Medical Necessity Statement: Based on the clinical judgement of myself and the referring provider, Mohs surgery is the most appropriate treatment for this cancer compared to other treatments.

## 2023-12-12 NOTE — ASU PATIENT PROFILE, ADULT - NSICDXPASTSURGICALHX_GEN_ALL_CORE_FT
PAST SURGICAL HISTORY:  History of surgery CHOLECYSTECTOMY, HYSTERECTOMY, RIGHT FOOT SURGERY, APPENDECTOMY

## 2023-12-15 DIAGNOSIS — H25.041 POSTERIOR SUBCAPSULAR POLAR AGE-RELATED CATARACT, RIGHT EYE: ICD-10-CM

## 2023-12-15 DIAGNOSIS — I10 ESSENTIAL (PRIMARY) HYPERTENSION: ICD-10-CM

## 2023-12-15 DIAGNOSIS — H25.11 AGE-RELATED NUCLEAR CATARACT, RIGHT EYE: ICD-10-CM

## 2023-12-15 DIAGNOSIS — K21.9 GASTRO-ESOPHAGEAL REFLUX DISEASE WITHOUT ESOPHAGITIS: ICD-10-CM

## 2023-12-31 ENCOUNTER — INPATIENT (INPATIENT)
Facility: HOSPITAL | Age: 88
LOS: 8 days | Discharge: HOME HEALTH | DRG: 535 | End: 2024-01-09
Attending: INTERNAL MEDICINE | Admitting: INTERNAL MEDICINE
Payer: MEDICARE

## 2023-12-31 VITALS
TEMPERATURE: 99 F | HEART RATE: 82 BPM | OXYGEN SATURATION: 100 % | WEIGHT: 162.92 LBS | RESPIRATION RATE: 18 BRPM | HEIGHT: 65 IN | DIASTOLIC BLOOD PRESSURE: 94 MMHG | SYSTOLIC BLOOD PRESSURE: 179 MMHG

## 2023-12-31 DIAGNOSIS — Z98.890 OTHER SPECIFIED POSTPROCEDURAL STATES: Chronic | ICD-10-CM

## 2023-12-31 LAB
ALBUMIN SERPL ELPH-MCNC: 3.9 G/DL — SIGNIFICANT CHANGE UP (ref 3.5–5.2)
ALBUMIN SERPL ELPH-MCNC: 3.9 G/DL — SIGNIFICANT CHANGE UP (ref 3.5–5.2)
ALP SERPL-CCNC: 77 U/L — SIGNIFICANT CHANGE UP (ref 30–115)
ALP SERPL-CCNC: 77 U/L — SIGNIFICANT CHANGE UP (ref 30–115)
ALT FLD-CCNC: 23 U/L — SIGNIFICANT CHANGE UP (ref 0–41)
ALT FLD-CCNC: 23 U/L — SIGNIFICANT CHANGE UP (ref 0–41)
ANION GAP SERPL CALC-SCNC: 11 MMOL/L — SIGNIFICANT CHANGE UP (ref 7–14)
ANION GAP SERPL CALC-SCNC: 11 MMOL/L — SIGNIFICANT CHANGE UP (ref 7–14)
APTT BLD: 29.7 SEC — SIGNIFICANT CHANGE UP (ref 27–39.2)
APTT BLD: 29.7 SEC — SIGNIFICANT CHANGE UP (ref 27–39.2)
AST SERPL-CCNC: 24 U/L — SIGNIFICANT CHANGE UP (ref 0–41)
AST SERPL-CCNC: 24 U/L — SIGNIFICANT CHANGE UP (ref 0–41)
BASOPHILS # BLD AUTO: 0.02 K/UL — SIGNIFICANT CHANGE UP (ref 0–0.2)
BASOPHILS # BLD AUTO: 0.02 K/UL — SIGNIFICANT CHANGE UP (ref 0–0.2)
BASOPHILS NFR BLD AUTO: 0.3 % — SIGNIFICANT CHANGE UP (ref 0–1)
BASOPHILS NFR BLD AUTO: 0.3 % — SIGNIFICANT CHANGE UP (ref 0–1)
BILIRUB SERPL-MCNC: 0.2 MG/DL — SIGNIFICANT CHANGE UP (ref 0.2–1.2)
BILIRUB SERPL-MCNC: 0.2 MG/DL — SIGNIFICANT CHANGE UP (ref 0.2–1.2)
BUN SERPL-MCNC: 24 MG/DL — HIGH (ref 10–20)
BUN SERPL-MCNC: 24 MG/DL — HIGH (ref 10–20)
CALCIUM SERPL-MCNC: 9 MG/DL — SIGNIFICANT CHANGE UP (ref 8.4–10.5)
CALCIUM SERPL-MCNC: 9 MG/DL — SIGNIFICANT CHANGE UP (ref 8.4–10.5)
CHLORIDE SERPL-SCNC: 107 MMOL/L — SIGNIFICANT CHANGE UP (ref 98–110)
CHLORIDE SERPL-SCNC: 107 MMOL/L — SIGNIFICANT CHANGE UP (ref 98–110)
CO2 SERPL-SCNC: 22 MMOL/L — SIGNIFICANT CHANGE UP (ref 17–32)
CO2 SERPL-SCNC: 22 MMOL/L — SIGNIFICANT CHANGE UP (ref 17–32)
CREAT SERPL-MCNC: 0.9 MG/DL — SIGNIFICANT CHANGE UP (ref 0.7–1.5)
CREAT SERPL-MCNC: 0.9 MG/DL — SIGNIFICANT CHANGE UP (ref 0.7–1.5)
EGFR: 62 ML/MIN/1.73M2 — SIGNIFICANT CHANGE UP
EGFR: 62 ML/MIN/1.73M2 — SIGNIFICANT CHANGE UP
EOSINOPHIL # BLD AUTO: 0.06 K/UL — SIGNIFICANT CHANGE UP (ref 0–0.7)
EOSINOPHIL # BLD AUTO: 0.06 K/UL — SIGNIFICANT CHANGE UP (ref 0–0.7)
EOSINOPHIL NFR BLD AUTO: 0.9 % — SIGNIFICANT CHANGE UP (ref 0–8)
EOSINOPHIL NFR BLD AUTO: 0.9 % — SIGNIFICANT CHANGE UP (ref 0–8)
GLUCOSE SERPL-MCNC: 128 MG/DL — HIGH (ref 70–99)
GLUCOSE SERPL-MCNC: 128 MG/DL — HIGH (ref 70–99)
HCT VFR BLD CALC: 39.5 % — SIGNIFICANT CHANGE UP (ref 37–47)
HCT VFR BLD CALC: 39.5 % — SIGNIFICANT CHANGE UP (ref 37–47)
HGB BLD-MCNC: 13.1 G/DL — SIGNIFICANT CHANGE UP (ref 12–16)
HGB BLD-MCNC: 13.1 G/DL — SIGNIFICANT CHANGE UP (ref 12–16)
IMM GRANULOCYTES NFR BLD AUTO: 0.8 % — HIGH (ref 0.1–0.3)
IMM GRANULOCYTES NFR BLD AUTO: 0.8 % — HIGH (ref 0.1–0.3)
INR BLD: 1.01 RATIO — SIGNIFICANT CHANGE UP (ref 0.65–1.3)
INR BLD: 1.01 RATIO — SIGNIFICANT CHANGE UP (ref 0.65–1.3)
LYMPHOCYTES # BLD AUTO: 1.59 K/UL — SIGNIFICANT CHANGE UP (ref 1.2–3.4)
LYMPHOCYTES # BLD AUTO: 1.59 K/UL — SIGNIFICANT CHANGE UP (ref 1.2–3.4)
LYMPHOCYTES # BLD AUTO: 24.7 % — SIGNIFICANT CHANGE UP (ref 20.5–51.1)
LYMPHOCYTES # BLD AUTO: 24.7 % — SIGNIFICANT CHANGE UP (ref 20.5–51.1)
MCHC RBC-ENTMCNC: 30.4 PG — SIGNIFICANT CHANGE UP (ref 27–31)
MCHC RBC-ENTMCNC: 30.4 PG — SIGNIFICANT CHANGE UP (ref 27–31)
MCHC RBC-ENTMCNC: 33.2 G/DL — SIGNIFICANT CHANGE UP (ref 32–37)
MCHC RBC-ENTMCNC: 33.2 G/DL — SIGNIFICANT CHANGE UP (ref 32–37)
MCV RBC AUTO: 91.6 FL — SIGNIFICANT CHANGE UP (ref 81–99)
MCV RBC AUTO: 91.6 FL — SIGNIFICANT CHANGE UP (ref 81–99)
MONOCYTES # BLD AUTO: 0.71 K/UL — HIGH (ref 0.1–0.6)
MONOCYTES # BLD AUTO: 0.71 K/UL — HIGH (ref 0.1–0.6)
MONOCYTES NFR BLD AUTO: 11 % — HIGH (ref 1.7–9.3)
MONOCYTES NFR BLD AUTO: 11 % — HIGH (ref 1.7–9.3)
NEUTROPHILS # BLD AUTO: 4 K/UL — SIGNIFICANT CHANGE UP (ref 1.4–6.5)
NEUTROPHILS # BLD AUTO: 4 K/UL — SIGNIFICANT CHANGE UP (ref 1.4–6.5)
NEUTROPHILS NFR BLD AUTO: 62.3 % — SIGNIFICANT CHANGE UP (ref 42.2–75.2)
NEUTROPHILS NFR BLD AUTO: 62.3 % — SIGNIFICANT CHANGE UP (ref 42.2–75.2)
NRBC # BLD: 0 /100 WBCS — SIGNIFICANT CHANGE UP (ref 0–0)
NRBC # BLD: 0 /100 WBCS — SIGNIFICANT CHANGE UP (ref 0–0)
PLATELET # BLD AUTO: 163 K/UL — SIGNIFICANT CHANGE UP (ref 130–400)
PLATELET # BLD AUTO: 163 K/UL — SIGNIFICANT CHANGE UP (ref 130–400)
PMV BLD: 9.5 FL — SIGNIFICANT CHANGE UP (ref 7.4–10.4)
PMV BLD: 9.5 FL — SIGNIFICANT CHANGE UP (ref 7.4–10.4)
POTASSIUM SERPL-MCNC: 4.5 MMOL/L — SIGNIFICANT CHANGE UP (ref 3.5–5)
POTASSIUM SERPL-MCNC: 4.5 MMOL/L — SIGNIFICANT CHANGE UP (ref 3.5–5)
POTASSIUM SERPL-SCNC: 4.5 MMOL/L — SIGNIFICANT CHANGE UP (ref 3.5–5)
POTASSIUM SERPL-SCNC: 4.5 MMOL/L — SIGNIFICANT CHANGE UP (ref 3.5–5)
PROT SERPL-MCNC: 7 G/DL — SIGNIFICANT CHANGE UP (ref 6–8)
PROT SERPL-MCNC: 7 G/DL — SIGNIFICANT CHANGE UP (ref 6–8)
PROTHROM AB SERPL-ACNC: 11.5 SEC — SIGNIFICANT CHANGE UP (ref 9.95–12.87)
PROTHROM AB SERPL-ACNC: 11.5 SEC — SIGNIFICANT CHANGE UP (ref 9.95–12.87)
RBC # BLD: 4.31 M/UL — SIGNIFICANT CHANGE UP (ref 4.2–5.4)
RBC # BLD: 4.31 M/UL — SIGNIFICANT CHANGE UP (ref 4.2–5.4)
RBC # FLD: 12.8 % — SIGNIFICANT CHANGE UP (ref 11.5–14.5)
RBC # FLD: 12.8 % — SIGNIFICANT CHANGE UP (ref 11.5–14.5)
SODIUM SERPL-SCNC: 140 MMOL/L — SIGNIFICANT CHANGE UP (ref 135–146)
SODIUM SERPL-SCNC: 140 MMOL/L — SIGNIFICANT CHANGE UP (ref 135–146)
WBC # BLD: 6.43 K/UL — SIGNIFICANT CHANGE UP (ref 4.8–10.8)
WBC # BLD: 6.43 K/UL — SIGNIFICANT CHANGE UP (ref 4.8–10.8)
WBC # FLD AUTO: 6.43 K/UL — SIGNIFICANT CHANGE UP (ref 4.8–10.8)
WBC # FLD AUTO: 6.43 K/UL — SIGNIFICANT CHANGE UP (ref 4.8–10.8)

## 2023-12-31 PROCEDURE — 73080 X-RAY EXAM OF ELBOW: CPT | Mod: 26,RT

## 2023-12-31 PROCEDURE — 73552 X-RAY EXAM OF FEMUR 2/>: CPT | Mod: 26,RT

## 2023-12-31 PROCEDURE — 99285 EMERGENCY DEPT VISIT HI MDM: CPT

## 2023-12-31 PROCEDURE — 73564 X-RAY EXAM KNEE 4 OR MORE: CPT | Mod: 26,RT

## 2023-12-31 PROCEDURE — 72170 X-RAY EXAM OF PELVIS: CPT | Mod: 26,XE

## 2023-12-31 PROCEDURE — 73502 X-RAY EXAM HIP UNI 2-3 VIEWS: CPT | Mod: 26,RT

## 2023-12-31 PROCEDURE — 71045 X-RAY EXAM CHEST 1 VIEW: CPT | Mod: 26

## 2023-12-31 RX ORDER — ACETAMINOPHEN 500 MG
650 TABLET ORAL ONCE
Refills: 0 | Status: COMPLETED | OUTPATIENT
Start: 2023-12-31 | End: 2023-12-31

## 2023-12-31 NOTE — ED ADULT TRIAGE NOTE - CHIEF COMPLAINT QUOTE
Pt BIBA from home s/p trip and fall down 1 step onto her right side. Denies hitting head/LOC/anticoag use.   Endorses right hip pain, with limited movement of RLE.

## 2024-01-01 DIAGNOSIS — S32.599A OTHER SPECIFIED FRACTURE OF UNSPECIFIED PUBIS, INITIAL ENCOUNTER FOR CLOSED FRACTURE: ICD-10-CM

## 2024-01-01 DIAGNOSIS — R09.89 OTHER SPECIFIED SYMPTOMS AND SIGNS INVOLVING THE CIRCULATORY AND RESPIRATORY SYSTEMS: ICD-10-CM

## 2024-01-01 PROBLEM — I10 ESSENTIAL (PRIMARY) HYPERTENSION: Chronic | Status: ACTIVE | Noted: 2023-12-12

## 2024-01-01 PROBLEM — K21.9 GASTRO-ESOPHAGEAL REFLUX DISEASE WITHOUT ESOPHAGITIS: Chronic | Status: ACTIVE | Noted: 2023-12-12

## 2024-01-01 LAB
A1C WITH ESTIMATED AVERAGE GLUCOSE RESULT: 6.1 % — HIGH (ref 4–5.6)
A1C WITH ESTIMATED AVERAGE GLUCOSE RESULT: 6.1 % — HIGH (ref 4–5.6)
ALBUMIN SERPL ELPH-MCNC: 3.7 G/DL — SIGNIFICANT CHANGE UP (ref 3.5–5.2)
ALBUMIN SERPL ELPH-MCNC: 3.7 G/DL — SIGNIFICANT CHANGE UP (ref 3.5–5.2)
ALBUMIN SERPL ELPH-MCNC: 4 G/DL — SIGNIFICANT CHANGE UP (ref 3.5–5.2)
ALBUMIN SERPL ELPH-MCNC: 4 G/DL — SIGNIFICANT CHANGE UP (ref 3.5–5.2)
ALP SERPL-CCNC: 72 U/L — SIGNIFICANT CHANGE UP (ref 30–115)
ALP SERPL-CCNC: 72 U/L — SIGNIFICANT CHANGE UP (ref 30–115)
ALP SERPL-CCNC: 74 U/L — SIGNIFICANT CHANGE UP (ref 30–115)
ALP SERPL-CCNC: 74 U/L — SIGNIFICANT CHANGE UP (ref 30–115)
ALT FLD-CCNC: 20 U/L — SIGNIFICANT CHANGE UP (ref 0–41)
ALT FLD-CCNC: 20 U/L — SIGNIFICANT CHANGE UP (ref 0–41)
ALT FLD-CCNC: 21 U/L — SIGNIFICANT CHANGE UP (ref 0–41)
ALT FLD-CCNC: 21 U/L — SIGNIFICANT CHANGE UP (ref 0–41)
ANION GAP SERPL CALC-SCNC: 11 MMOL/L — SIGNIFICANT CHANGE UP (ref 7–14)
ANION GAP SERPL CALC-SCNC: 11 MMOL/L — SIGNIFICANT CHANGE UP (ref 7–14)
ANION GAP SERPL CALC-SCNC: 12 MMOL/L — SIGNIFICANT CHANGE UP (ref 7–14)
ANION GAP SERPL CALC-SCNC: 12 MMOL/L — SIGNIFICANT CHANGE UP (ref 7–14)
AST SERPL-CCNC: 23 U/L — SIGNIFICANT CHANGE UP (ref 0–41)
AST SERPL-CCNC: 23 U/L — SIGNIFICANT CHANGE UP (ref 0–41)
AST SERPL-CCNC: 24 U/L — SIGNIFICANT CHANGE UP (ref 0–41)
AST SERPL-CCNC: 24 U/L — SIGNIFICANT CHANGE UP (ref 0–41)
BASOPHILS # BLD AUTO: 0.01 K/UL — SIGNIFICANT CHANGE UP (ref 0–0.2)
BASOPHILS # BLD AUTO: 0.01 K/UL — SIGNIFICANT CHANGE UP (ref 0–0.2)
BASOPHILS # BLD AUTO: 0.02 K/UL — SIGNIFICANT CHANGE UP (ref 0–0.2)
BASOPHILS # BLD AUTO: 0.02 K/UL — SIGNIFICANT CHANGE UP (ref 0–0.2)
BASOPHILS NFR BLD AUTO: 0.2 % — SIGNIFICANT CHANGE UP (ref 0–1)
BASOPHILS NFR BLD AUTO: 0.2 % — SIGNIFICANT CHANGE UP (ref 0–1)
BASOPHILS NFR BLD AUTO: 0.4 % — SIGNIFICANT CHANGE UP (ref 0–1)
BASOPHILS NFR BLD AUTO: 0.4 % — SIGNIFICANT CHANGE UP (ref 0–1)
BILIRUB SERPL-MCNC: 0.5 MG/DL — SIGNIFICANT CHANGE UP (ref 0.2–1.2)
BILIRUB SERPL-MCNC: 0.5 MG/DL — SIGNIFICANT CHANGE UP (ref 0.2–1.2)
BILIRUB SERPL-MCNC: 0.7 MG/DL — SIGNIFICANT CHANGE UP (ref 0.2–1.2)
BILIRUB SERPL-MCNC: 0.7 MG/DL — SIGNIFICANT CHANGE UP (ref 0.2–1.2)
BLD GP AB SCN SERPL QL: SIGNIFICANT CHANGE UP
BLD GP AB SCN SERPL QL: SIGNIFICANT CHANGE UP
BUN SERPL-MCNC: 19 MG/DL — SIGNIFICANT CHANGE UP (ref 10–20)
BUN SERPL-MCNC: 19 MG/DL — SIGNIFICANT CHANGE UP (ref 10–20)
BUN SERPL-MCNC: 20 MG/DL — SIGNIFICANT CHANGE UP (ref 10–20)
BUN SERPL-MCNC: 20 MG/DL — SIGNIFICANT CHANGE UP (ref 10–20)
CALCIUM SERPL-MCNC: 9 MG/DL — SIGNIFICANT CHANGE UP (ref 8.4–10.5)
CALCIUM SERPL-MCNC: 9 MG/DL — SIGNIFICANT CHANGE UP (ref 8.4–10.5)
CALCIUM SERPL-MCNC: 9.2 MG/DL — SIGNIFICANT CHANGE UP (ref 8.4–10.5)
CALCIUM SERPL-MCNC: 9.2 MG/DL — SIGNIFICANT CHANGE UP (ref 8.4–10.5)
CHLORIDE SERPL-SCNC: 102 MMOL/L — SIGNIFICANT CHANGE UP (ref 98–110)
CHLORIDE SERPL-SCNC: 102 MMOL/L — SIGNIFICANT CHANGE UP (ref 98–110)
CHLORIDE SERPL-SCNC: 106 MMOL/L — SIGNIFICANT CHANGE UP (ref 98–110)
CHLORIDE SERPL-SCNC: 106 MMOL/L — SIGNIFICANT CHANGE UP (ref 98–110)
CHOLEST SERPL-MCNC: 190 MG/DL — SIGNIFICANT CHANGE UP
CHOLEST SERPL-MCNC: 190 MG/DL — SIGNIFICANT CHANGE UP
CO2 SERPL-SCNC: 24 MMOL/L — SIGNIFICANT CHANGE UP (ref 17–32)
CO2 SERPL-SCNC: 24 MMOL/L — SIGNIFICANT CHANGE UP (ref 17–32)
CO2 SERPL-SCNC: 25 MMOL/L — SIGNIFICANT CHANGE UP (ref 17–32)
CO2 SERPL-SCNC: 25 MMOL/L — SIGNIFICANT CHANGE UP (ref 17–32)
CREAT SERPL-MCNC: 0.7 MG/DL — SIGNIFICANT CHANGE UP (ref 0.7–1.5)
EGFR: 84 ML/MIN/1.73M2 — SIGNIFICANT CHANGE UP
EOSINOPHIL # BLD AUTO: 0.09 K/UL — SIGNIFICANT CHANGE UP (ref 0–0.7)
EOSINOPHIL # BLD AUTO: 0.09 K/UL — SIGNIFICANT CHANGE UP (ref 0–0.7)
EOSINOPHIL # BLD AUTO: 0.11 K/UL — SIGNIFICANT CHANGE UP (ref 0–0.7)
EOSINOPHIL # BLD AUTO: 0.11 K/UL — SIGNIFICANT CHANGE UP (ref 0–0.7)
EOSINOPHIL NFR BLD AUTO: 2 % — SIGNIFICANT CHANGE UP (ref 0–8)
EOSINOPHIL NFR BLD AUTO: 2 % — SIGNIFICANT CHANGE UP (ref 0–8)
EOSINOPHIL NFR BLD AUTO: 2.3 % — SIGNIFICANT CHANGE UP (ref 0–8)
EOSINOPHIL NFR BLD AUTO: 2.3 % — SIGNIFICANT CHANGE UP (ref 0–8)
ESTIMATED AVERAGE GLUCOSE: 128 MG/DL — HIGH (ref 68–114)
ESTIMATED AVERAGE GLUCOSE: 128 MG/DL — HIGH (ref 68–114)
GLUCOSE SERPL-MCNC: 130 MG/DL — HIGH (ref 70–99)
GLUCOSE SERPL-MCNC: 130 MG/DL — HIGH (ref 70–99)
GLUCOSE SERPL-MCNC: 144 MG/DL — HIGH (ref 70–99)
GLUCOSE SERPL-MCNC: 144 MG/DL — HIGH (ref 70–99)
HCT VFR BLD CALC: 38 % — SIGNIFICANT CHANGE UP (ref 37–47)
HCT VFR BLD CALC: 38 % — SIGNIFICANT CHANGE UP (ref 37–47)
HCT VFR BLD CALC: 39.5 % — SIGNIFICANT CHANGE UP (ref 37–47)
HCT VFR BLD CALC: 39.5 % — SIGNIFICANT CHANGE UP (ref 37–47)
HDLC SERPL-MCNC: 87 MG/DL — SIGNIFICANT CHANGE UP
HDLC SERPL-MCNC: 87 MG/DL — SIGNIFICANT CHANGE UP
HGB BLD-MCNC: 12.6 G/DL — SIGNIFICANT CHANGE UP (ref 12–16)
HGB BLD-MCNC: 12.6 G/DL — SIGNIFICANT CHANGE UP (ref 12–16)
HGB BLD-MCNC: 12.9 G/DL — SIGNIFICANT CHANGE UP (ref 12–16)
HGB BLD-MCNC: 12.9 G/DL — SIGNIFICANT CHANGE UP (ref 12–16)
IMM GRANULOCYTES NFR BLD AUTO: 0.4 % — HIGH (ref 0.1–0.3)
IMM GRANULOCYTES NFR BLD AUTO: 0.4 % — HIGH (ref 0.1–0.3)
IMM GRANULOCYTES NFR BLD AUTO: 0.9 % — HIGH (ref 0.1–0.3)
IMM GRANULOCYTES NFR BLD AUTO: 0.9 % — HIGH (ref 0.1–0.3)
LACTATE SERPL-SCNC: 2.1 MMOL/L — HIGH (ref 0.7–2)
LACTATE SERPL-SCNC: 2.1 MMOL/L — HIGH (ref 0.7–2)
LIPID PNL WITH DIRECT LDL SERPL: 92 MG/DL — SIGNIFICANT CHANGE UP
LIPID PNL WITH DIRECT LDL SERPL: 92 MG/DL — SIGNIFICANT CHANGE UP
LYMPHOCYTES # BLD AUTO: 1.51 K/UL — SIGNIFICANT CHANGE UP (ref 1.2–3.4)
LYMPHOCYTES # BLD AUTO: 1.51 K/UL — SIGNIFICANT CHANGE UP (ref 1.2–3.4)
LYMPHOCYTES # BLD AUTO: 1.65 K/UL — SIGNIFICANT CHANGE UP (ref 1.2–3.4)
LYMPHOCYTES # BLD AUTO: 1.65 K/UL — SIGNIFICANT CHANGE UP (ref 1.2–3.4)
LYMPHOCYTES # BLD AUTO: 31.8 % — SIGNIFICANT CHANGE UP (ref 20.5–51.1)
LYMPHOCYTES # BLD AUTO: 31.8 % — SIGNIFICANT CHANGE UP (ref 20.5–51.1)
LYMPHOCYTES # BLD AUTO: 36 % — SIGNIFICANT CHANGE UP (ref 20.5–51.1)
LYMPHOCYTES # BLD AUTO: 36 % — SIGNIFICANT CHANGE UP (ref 20.5–51.1)
MAGNESIUM SERPL-MCNC: 1.9 MG/DL — SIGNIFICANT CHANGE UP (ref 1.8–2.4)
MAGNESIUM SERPL-MCNC: 1.9 MG/DL — SIGNIFICANT CHANGE UP (ref 1.8–2.4)
MCHC RBC-ENTMCNC: 30.1 PG — SIGNIFICANT CHANGE UP (ref 27–31)
MCHC RBC-ENTMCNC: 30.1 PG — SIGNIFICANT CHANGE UP (ref 27–31)
MCHC RBC-ENTMCNC: 30.2 PG — SIGNIFICANT CHANGE UP (ref 27–31)
MCHC RBC-ENTMCNC: 30.2 PG — SIGNIFICANT CHANGE UP (ref 27–31)
MCHC RBC-ENTMCNC: 32.7 G/DL — SIGNIFICANT CHANGE UP (ref 32–37)
MCHC RBC-ENTMCNC: 32.7 G/DL — SIGNIFICANT CHANGE UP (ref 32–37)
MCHC RBC-ENTMCNC: 33.2 G/DL — SIGNIFICANT CHANGE UP (ref 32–37)
MCHC RBC-ENTMCNC: 33.2 G/DL — SIGNIFICANT CHANGE UP (ref 32–37)
MCV RBC AUTO: 91.1 FL — SIGNIFICANT CHANGE UP (ref 81–99)
MCV RBC AUTO: 91.1 FL — SIGNIFICANT CHANGE UP (ref 81–99)
MCV RBC AUTO: 92.3 FL — SIGNIFICANT CHANGE UP (ref 81–99)
MCV RBC AUTO: 92.3 FL — SIGNIFICANT CHANGE UP (ref 81–99)
MONOCYTES # BLD AUTO: 0.49 K/UL — SIGNIFICANT CHANGE UP (ref 0.1–0.6)
MONOCYTES # BLD AUTO: 0.49 K/UL — SIGNIFICANT CHANGE UP (ref 0.1–0.6)
MONOCYTES # BLD AUTO: 0.59 K/UL — SIGNIFICANT CHANGE UP (ref 0.1–0.6)
MONOCYTES # BLD AUTO: 0.59 K/UL — SIGNIFICANT CHANGE UP (ref 0.1–0.6)
MONOCYTES NFR BLD AUTO: 10.7 % — HIGH (ref 1.7–9.3)
MONOCYTES NFR BLD AUTO: 10.7 % — HIGH (ref 1.7–9.3)
MONOCYTES NFR BLD AUTO: 12.4 % — HIGH (ref 1.7–9.3)
MONOCYTES NFR BLD AUTO: 12.4 % — HIGH (ref 1.7–9.3)
NEUTROPHILS # BLD AUTO: 2.3 K/UL — SIGNIFICANT CHANGE UP (ref 1.4–6.5)
NEUTROPHILS # BLD AUTO: 2.3 K/UL — SIGNIFICANT CHANGE UP (ref 1.4–6.5)
NEUTROPHILS # BLD AUTO: 2.5 K/UL — SIGNIFICANT CHANGE UP (ref 1.4–6.5)
NEUTROPHILS # BLD AUTO: 2.5 K/UL — SIGNIFICANT CHANGE UP (ref 1.4–6.5)
NEUTROPHILS NFR BLD AUTO: 50.2 % — SIGNIFICANT CHANGE UP (ref 42.2–75.2)
NEUTROPHILS NFR BLD AUTO: 50.2 % — SIGNIFICANT CHANGE UP (ref 42.2–75.2)
NEUTROPHILS NFR BLD AUTO: 52.7 % — SIGNIFICANT CHANGE UP (ref 42.2–75.2)
NEUTROPHILS NFR BLD AUTO: 52.7 % — SIGNIFICANT CHANGE UP (ref 42.2–75.2)
NON HDL CHOLESTEROL: 103 MG/DL — SIGNIFICANT CHANGE UP
NON HDL CHOLESTEROL: 103 MG/DL — SIGNIFICANT CHANGE UP
NRBC # BLD: 0 /100 WBCS — SIGNIFICANT CHANGE UP (ref 0–0)
PLATELET # BLD AUTO: 145 K/UL — SIGNIFICANT CHANGE UP (ref 130–400)
PLATELET # BLD AUTO: 145 K/UL — SIGNIFICANT CHANGE UP (ref 130–400)
PLATELET # BLD AUTO: 148 K/UL — SIGNIFICANT CHANGE UP (ref 130–400)
PLATELET # BLD AUTO: 148 K/UL — SIGNIFICANT CHANGE UP (ref 130–400)
PMV BLD: 10 FL — SIGNIFICANT CHANGE UP (ref 7.4–10.4)
PMV BLD: 10 FL — SIGNIFICANT CHANGE UP (ref 7.4–10.4)
PMV BLD: 9.6 FL — SIGNIFICANT CHANGE UP (ref 7.4–10.4)
PMV BLD: 9.6 FL — SIGNIFICANT CHANGE UP (ref 7.4–10.4)
POTASSIUM SERPL-MCNC: 4.2 MMOL/L — SIGNIFICANT CHANGE UP (ref 3.5–5)
POTASSIUM SERPL-MCNC: 4.2 MMOL/L — SIGNIFICANT CHANGE UP (ref 3.5–5)
POTASSIUM SERPL-MCNC: 4.5 MMOL/L — SIGNIFICANT CHANGE UP (ref 3.5–5)
POTASSIUM SERPL-MCNC: 4.5 MMOL/L — SIGNIFICANT CHANGE UP (ref 3.5–5)
POTASSIUM SERPL-SCNC: 4.2 MMOL/L — SIGNIFICANT CHANGE UP (ref 3.5–5)
POTASSIUM SERPL-SCNC: 4.2 MMOL/L — SIGNIFICANT CHANGE UP (ref 3.5–5)
POTASSIUM SERPL-SCNC: 4.5 MMOL/L — SIGNIFICANT CHANGE UP (ref 3.5–5)
POTASSIUM SERPL-SCNC: 4.5 MMOL/L — SIGNIFICANT CHANGE UP (ref 3.5–5)
PROT SERPL-MCNC: 6.6 G/DL — SIGNIFICANT CHANGE UP (ref 6–8)
PROT SERPL-MCNC: 6.6 G/DL — SIGNIFICANT CHANGE UP (ref 6–8)
PROT SERPL-MCNC: 6.9 G/DL — SIGNIFICANT CHANGE UP (ref 6–8)
PROT SERPL-MCNC: 6.9 G/DL — SIGNIFICANT CHANGE UP (ref 6–8)
RBC # BLD: 4.17 M/UL — LOW (ref 4.2–5.4)
RBC # BLD: 4.17 M/UL — LOW (ref 4.2–5.4)
RBC # BLD: 4.28 M/UL — SIGNIFICANT CHANGE UP (ref 4.2–5.4)
RBC # BLD: 4.28 M/UL — SIGNIFICANT CHANGE UP (ref 4.2–5.4)
RBC # FLD: 12.8 % — SIGNIFICANT CHANGE UP (ref 11.5–14.5)
RBC # FLD: 12.8 % — SIGNIFICANT CHANGE UP (ref 11.5–14.5)
RBC # FLD: 13 % — SIGNIFICANT CHANGE UP (ref 11.5–14.5)
RBC # FLD: 13 % — SIGNIFICANT CHANGE UP (ref 11.5–14.5)
SODIUM SERPL-SCNC: 138 MMOL/L — SIGNIFICANT CHANGE UP (ref 135–146)
SODIUM SERPL-SCNC: 138 MMOL/L — SIGNIFICANT CHANGE UP (ref 135–146)
SODIUM SERPL-SCNC: 142 MMOL/L — SIGNIFICANT CHANGE UP (ref 135–146)
SODIUM SERPL-SCNC: 142 MMOL/L — SIGNIFICANT CHANGE UP (ref 135–146)
TRIGL SERPL-MCNC: 54 MG/DL — SIGNIFICANT CHANGE UP
TRIGL SERPL-MCNC: 54 MG/DL — SIGNIFICANT CHANGE UP
WBC # BLD: 4.58 K/UL — LOW (ref 4.8–10.8)
WBC # BLD: 4.58 K/UL — LOW (ref 4.8–10.8)
WBC # BLD: 4.75 K/UL — LOW (ref 4.8–10.8)
WBC # BLD: 4.75 K/UL — LOW (ref 4.8–10.8)
WBC # FLD AUTO: 4.58 K/UL — LOW (ref 4.8–10.8)
WBC # FLD AUTO: 4.58 K/UL — LOW (ref 4.8–10.8)
WBC # FLD AUTO: 4.75 K/UL — LOW (ref 4.8–10.8)
WBC # FLD AUTO: 4.75 K/UL — LOW (ref 4.8–10.8)

## 2024-01-01 PROCEDURE — 85025 COMPLETE CBC W/AUTO DIFF WBC: CPT

## 2024-01-01 PROCEDURE — 85027 COMPLETE CBC AUTOMATED: CPT

## 2024-01-01 PROCEDURE — 97535 SELF CARE MNGMENT TRAINING: CPT | Mod: GO

## 2024-01-01 PROCEDURE — 83735 ASSAY OF MAGNESIUM: CPT

## 2024-01-01 PROCEDURE — 74018 RADEX ABDOMEN 1 VIEW: CPT

## 2024-01-01 PROCEDURE — 80053 COMPREHEN METABOLIC PANEL: CPT

## 2024-01-01 PROCEDURE — 93005 ELECTROCARDIOGRAM TRACING: CPT

## 2024-01-01 PROCEDURE — 93880 EXTRACRANIAL BILAT STUDY: CPT

## 2024-01-01 PROCEDURE — 80061 LIPID PANEL: CPT

## 2024-01-01 PROCEDURE — 97116 GAIT TRAINING THERAPY: CPT | Mod: GP

## 2024-01-01 PROCEDURE — 74018 RADEX ABDOMEN 1 VIEW: CPT | Mod: 26

## 2024-01-01 PROCEDURE — 83036 HEMOGLOBIN GLYCOSYLATED A1C: CPT

## 2024-01-01 PROCEDURE — 83605 ASSAY OF LACTIC ACID: CPT

## 2024-01-01 PROCEDURE — 97110 THERAPEUTIC EXERCISES: CPT | Mod: GP

## 2024-01-01 PROCEDURE — 97530 THERAPEUTIC ACTIVITIES: CPT | Mod: GP

## 2024-01-01 PROCEDURE — 0225U NFCT DS DNA&RNA 21 SARSCOV2: CPT

## 2024-01-01 PROCEDURE — 74176 CT ABD & PELVIS W/O CONTRAST: CPT | Mod: 26,MA

## 2024-01-01 PROCEDURE — 82962 GLUCOSE BLOOD TEST: CPT

## 2024-01-01 PROCEDURE — 36415 COLL VENOUS BLD VENIPUNCTURE: CPT

## 2024-01-01 PROCEDURE — 80048 BASIC METABOLIC PNL TOTAL CA: CPT

## 2024-01-01 PROCEDURE — 93010 ELECTROCARDIOGRAM REPORT: CPT

## 2024-01-01 PROCEDURE — 97166 OT EVAL MOD COMPLEX 45 MIN: CPT | Mod: GO

## 2024-01-01 PROCEDURE — 97163 PT EVAL HIGH COMPLEX 45 MIN: CPT | Mod: GP

## 2024-01-01 PROCEDURE — 99223 1ST HOSP IP/OBS HIGH 75: CPT

## 2024-01-01 RX ORDER — PANTOPRAZOLE SODIUM 20 MG/1
40 TABLET, DELAYED RELEASE ORAL
Refills: 0 | Status: DISCONTINUED | OUTPATIENT
Start: 2024-01-01 | End: 2024-01-09

## 2024-01-01 RX ORDER — SODIUM CHLORIDE 9 MG/ML
1000 INJECTION, SOLUTION INTRAVENOUS
Refills: 0 | Status: DISCONTINUED | OUTPATIENT
Start: 2024-01-01 | End: 2024-01-03

## 2024-01-01 RX ORDER — KETOROLAC TROMETHAMINE 0.5 %
1 DROPS OPHTHALMIC (EYE)
Refills: 0 | Status: DISCONTINUED | OUTPATIENT
Start: 2024-01-01 | End: 2024-01-09

## 2024-01-01 RX ORDER — ENOXAPARIN SODIUM 100 MG/ML
40 INJECTION SUBCUTANEOUS EVERY 24 HOURS
Refills: 0 | Status: DISCONTINUED | OUTPATIENT
Start: 2024-01-01 | End: 2024-01-09

## 2024-01-01 RX ORDER — KETOROLAC TROMETHAMINE 0.5 %
1 DROPS OPHTHALMIC (EYE)
Refills: 0 | DISCHARGE

## 2024-01-01 RX ORDER — LANOLIN ALCOHOL/MO/W.PET/CERES
3 CREAM (GRAM) TOPICAL AT BEDTIME
Refills: 0 | Status: DISCONTINUED | OUTPATIENT
Start: 2024-01-01 | End: 2024-01-09

## 2024-01-01 RX ORDER — ACETAMINOPHEN 500 MG
650 TABLET ORAL EVERY 6 HOURS
Refills: 0 | Status: DISCONTINUED | OUTPATIENT
Start: 2024-01-01 | End: 2024-01-09

## 2024-01-01 RX ORDER — ATORVASTATIN CALCIUM 80 MG/1
40 TABLET, FILM COATED ORAL AT BEDTIME
Refills: 0 | Status: DISCONTINUED | OUTPATIENT
Start: 2024-01-01 | End: 2024-01-09

## 2024-01-01 RX ORDER — OMEPRAZOLE 10 MG/1
1 CAPSULE, DELAYED RELEASE ORAL
Refills: 0 | DISCHARGE

## 2024-01-01 RX ORDER — OMEPRAZOLE 10 MG/1
0 CAPSULE, DELAYED RELEASE ORAL
Refills: 0 | DISCHARGE

## 2024-01-01 RX ORDER — MORPHINE SULFATE 50 MG/1
2 CAPSULE, EXTENDED RELEASE ORAL EVERY 4 HOURS
Refills: 0 | Status: DISCONTINUED | OUTPATIENT
Start: 2024-01-01 | End: 2024-01-01

## 2024-01-01 RX ORDER — OXYCODONE HYDROCHLORIDE 5 MG/1
5 TABLET ORAL EVERY 6 HOURS
Refills: 0 | Status: DISCONTINUED | OUTPATIENT
Start: 2024-01-01 | End: 2024-01-01

## 2024-01-01 RX ORDER — LOSARTAN POTASSIUM 100 MG/1
100 TABLET, FILM COATED ORAL DAILY
Refills: 0 | Status: DISCONTINUED | OUTPATIENT
Start: 2024-01-01 | End: 2024-01-09

## 2024-01-01 RX ADMIN — Medication 1 DROP(S): at 12:00

## 2024-01-01 RX ADMIN — Medication 650 MILLIGRAM(S): at 00:07

## 2024-01-01 RX ADMIN — Medication 1 DROP(S): at 04:03

## 2024-01-01 RX ADMIN — PANTOPRAZOLE SODIUM 40 MILLIGRAM(S): 20 TABLET, DELAYED RELEASE ORAL at 06:48

## 2024-01-01 RX ADMIN — Medication 1 DROP(S): at 21:30

## 2024-01-01 RX ADMIN — ENOXAPARIN SODIUM 40 MILLIGRAM(S): 100 INJECTION SUBCUTANEOUS at 17:49

## 2024-01-01 RX ADMIN — SODIUM CHLORIDE 75 MILLILITER(S): 9 INJECTION, SOLUTION INTRAVENOUS at 08:50

## 2024-01-01 RX ADMIN — Medication 1 DROP(S): at 17:49

## 2024-01-01 RX ADMIN — LOSARTAN POTASSIUM 100 MILLIGRAM(S): 100 TABLET, FILM COATED ORAL at 06:48

## 2024-01-01 RX ADMIN — SODIUM CHLORIDE 75 MILLILITER(S): 9 INJECTION, SOLUTION INTRAVENOUS at 04:37

## 2024-01-01 RX ADMIN — Medication 1 DROP(S): at 08:50

## 2024-01-01 NOTE — H&P ADULT - NSHPLABSRESULTS_GEN_ALL_CORE
Spoke to patient in regards to abnormal labs.    CBC Full  -  ( 01 Jan 2024 12:07 )  WBC Count : 4.58 K/uL  Hemoglobin : 12.9 g/dL  Hematocrit : 39.5 %  Platelet Count - Automated : 145 K/uL  Mean Cell Volume : 92.3 fL  Mean Cell Hemoglobin : 30.1 pg  Mean Cell Hemoglobin Concentration : 32.7 g/dL  Auto Neutrophil # : 2.30 K/uL  Auto Lymphocyte # : 1.65 K/uL  Auto Monocyte # : 0.49 K/uL  Auto Eosinophil # : 0.09 K/uL  Auto Basophil # : 0.01 K/uL  Auto Neutrophil % : 50.2 %  Auto Lymphocyte % : 36.0 %  Auto Monocyte % : 10.7 %  Auto Eosinophil % : 2.0 %  Auto Basophil % : 0.2 %    BMP:    01-01 @ 12:07    Blood Urea Nitrogen - 20  Calcium - 9.2  Carbond Dioxide - 25  Chloride - 102  Creatinine - 0.7  Glucose - 144  Potassium - 4.5  Sodium - 138      Hemoglobin A1c -   PT/INR - ( 31 Dec 2023 22:39 )   PT: 11.50 sec;   INR: 1.01 ratio         PTT - ( 31 Dec 2023 22:39 )  PTT:29.7 sec  Urine Culture:

## 2024-01-01 NOTE — ED PROVIDER NOTE - PHYSICAL EXAMINATION
CONSTITUTIONAL: Well-appearing; well-nourished; in no apparent distress.   EYES: PERRL; EOM intact.   ENT: normal nose; no rhinorrhea; normal pharynx with no tonsillar hypertrophy.   NECK: Supple; non-tender; no cervical lymphadenopathy.  CARDIOVASCULAR: Normal S1, S2; no murmurs, rubs, or gallops.   RESPIRATORY: Normal chest excursion with respiration; breath sounds clear and equal bilaterally; no wheezes, rhonchi, or rales.  GI/: Normal bowel sounds; non-distended; non-tender; no palpable organomegaly.   MS: No evidence of trauma or deformity.  No midline spinal ttp. Stable pelvis. + decreased rom of rt hip secondary to pain. distal pulses are normal.   SKIN: + superficial abrasion to rt elbow  NEURO/PSYCH: A & O x 4; grossly unremarkable. mood and manner are appropriate. Grooming and personal hygiene are appropriate.

## 2024-01-01 NOTE — ED PROVIDER NOTE - OBJECTIVE STATEMENT
88-year-old female with history of hypertension, hyperlipidemia presenting to ER with right hip pain status post fall.  Patient had mechanical trip and fall down 1 step while trying to take the garbage out.  She landed on right hip.  No head trauma/injury.  Patient has not been able to bear weight/ambulate since fall.  Patient notes small abrasion to right elbow.  Tdap is up-to-date.  Patient denies any neck pain, back pain, anticoagulant use, extremity numbness/tingling, abdominal pain, bowel or bladder incontinence.

## 2024-01-01 NOTE — H&P ADULT - ASSESSMENT
88 yrs old F with PMHx of HTN, HLD, presenting from home with right hip pain status post fall.     #Rt. pubic tubercle fracture s/p Mechanical fall  - CT showed Right pubic tubercle comminuted fracture without significant displacement extending towards the medial superior ramus. Associated right pelvic blood.  - F/u x-ray Rt. elbow  - Follow ortho recs  - NPO for procedure, Pre-op labs  - c/w pain control, IV fluids  - DVT ppx    #HTN  - BP uncontrolled likely due to pain  - c/w Pain control and BP meds    #HLD  - c/w Statins    #Misc  -DVT prophylaxis: Lovenox  -GI prophylaxis:   -Diet: NPO   -Activity:   -Dispo: Med    -Med rec confirmed with the patient.     88 yrs old F with PMHx of HTN, HLD, presenting from home with right hip pain status post fall.     #Rt. pubic tubercle fracture s/p Mechanical fall  - CT showed Right pubic tubercle comminuted fracture without significant displacement extending towards the medial superior ramus. Associated right pelvic blood.  - F/u x-ray Rt. elbow  - Follow ortho recs  - NPO for procedure, Pre-op labs  - c/w pain control, IV fluids  - DVT ppx    #HTN  - BP uncontrolled likely due to pain  - c/w Pain control   - c/w Losartan 100mg     #HLD  - c/w Lipitor    #Recent cataract Sx about 2 weeks ago  - c/w Ketorolac eye gtt    #Misc  -DVT prophylaxis: Lovenox  -GI prophylaxis:  PPI  -Diet: NPO   -Activity: Bedrest  -Dispo: Med    -Med rec confirmed with the patient.

## 2024-01-01 NOTE — H&P ADULT - NSHPPHYSICALEXAM_GEN_ALL_CORE
GENERAL: NAD, lying in bed comfortably  CHEST/LUNG: Clear to auscultation bilaterally; No rales, rhonchi, wheezing, or rubs. Unlabored respirations  HEART: Regular rate and rhythm  ABDOMEN: Soft, Nontender, Nondistended  EXTREMITIES: Limited ROM RLE, superficial abrasion RUE  NERVOUS SYSTEM:  Alert & Oriented X3, speech clear. No deficits

## 2024-01-01 NOTE — CONSULT NOTE ADULT - SUBJECTIVE AND OBJECTIVE BOX
ORTHOPAEDIC SURGERY CONSULT NOTE    Reason for Consult: Right pubic tubercle fracture     HPI: 88yFemale presents with pain in right groin following mechanical fall down 1 step onto cement floor. Endorses pain in groin with ambulation. Denies pain at rest. Patient denies head trauma or LOC. States that she had pain initially over right elbow following fall but has since resolved. Denies pain elsewhere. Endorses numbness in toes at baseline.     PAST MEDICAL & SURGICAL HISTORY:  Hypertension      GERD (gastroesophageal reflux disease)      History of surgery  CHOLECYSTECTOMY, HYSTERECTOMY, RIGHT FOOT SURGERY, APPENDECTOMY        Allergies: No Known Drug Allergies  Chlorine wipes  rash, itching (Rash)    Medications: acetaminophen     Tablet .. 650 milliGRAM(s) Oral every 6 hours PRN  atorvastatin 40 milliGRAM(s) Oral at bedtime  enoxaparin Injectable 40 milliGRAM(s) SubCutaneous every 24 hours  ketorolac 0.5% Ophthalmic Solution 1 Drop(s) Both EYES four times a day  lactated ringers. 1000 milliLiter(s) IV Continuous <Continuous>  losartan 100 milliGRAM(s) Oral daily  melatonin 3 milliGRAM(s) Oral at bedtime PRN  morphine  - Injectable 2 milliGRAM(s) IV Push every 4 hours PRN  pantoprazole    Tablet 40 milliGRAM(s) Oral before breakfast      PHYSICAL EXAM:  Vital Signs Last 24 Hrs  T(C): 37.1 (31 Dec 2023 22:12), Max: 37.1 (31 Dec 2023 22:12)  T(F): 98.7 (31 Dec 2023 22:12), Max: 98.7 (31 Dec 2023 22:12)  HR: 81 (01 Jan 2024 05:58) (81 - 82)  BP: 165/79 (01 Jan 2024 05:58) (165/79 - 179/94)  BP(mean): 110 (01 Jan 2024 05:58) (110 - 110)  RR: 18 (31 Dec 2023 22:12) (18 - 18)  SpO2: 100% (31 Dec 2023 22:12) (100% - 100%)    Parameters below as of 31 Dec 2023 22:12  Patient On (Oxygen Delivery Method): room air        Physical Exam:  General: NAD. AAOx3.  Resp: NLB on RA.    RUE:  No open skin or wounds  NTTP shoulder, upper arm, elbow, forearm, wrist or hand  Full ROM at shoulder, elbow, wrist, and   SILT in Ax/M/U/R distributions.  AIN/PIN/U motor intact.  2+ distal pulses with normal cap refill at distal finger tips.     LUE:  No open skin or wounds  NTTP shoulder, upper arm, elbow, forearm, wrist or hand  Full ROM at shoulder, elbow, wrist, and   SILT in Ax/M/U/R distributions.   AIN/PIN/U motor intact.  2+ distal pulses with normal cap refill at distal finger tips.     RLE:  No open skin or wounds  TTP over right groin   NTTP hip, thigh, knee, leg, ankle or foot.  Full ROM at hip, knee, ankle and toes.  No pain with log-roll or axial compression.  Able to actively SLR.  SILT DP/SP/T/Blue/Sa, subjective numbness in toes which is patients baseline   EHL/FHL/TA/Gs motor intact.  2+ DP/PT pulses with normal cap refill distally.  Compartments soft and compressible.     LLE:   No open skin or wounds  NTTP hip, thigh, knee, leg, ankle or foot.   Full ROM at hip, knee, ankle and toes   No pain with log-roll or axial compression  Able to actively SLR.  SILT DP/SP/T/Blue/Sa, subjective numbness in toes which is patients baseline   EHL/FHL/TA/Gs motor intact.  2+ DP/PT pulses with normal cap refill distally.  Compartments soft and compressible. No pain on passive stretch.    Labs:                        13.1   6.43  )-----------( 163      ( 31 Dec 2023 22:39 )             39.5     12-31    140  |  107  |  24<H>  ----------------------------<  128<H>  4.5   |  22  |  0.9    Ca    9.0      31 Dec 2023 22:39    TPro  7.0  /  Alb  3.9  /  TBili  0.2  /  DBili  x   /  AST  24  /  ALT  23  /  AlkPhos  77  12-31    PT/INR - ( 31 Dec 2023 22:39 )   PT: 11.50 sec;   INR: 1.01 ratio         PTT - ( 31 Dec 2023 22:39 )  PTT:29.7 sec    Imaging XR:    Pelvis - Right pubic tubercle fracture   CT pelvis = minimally displaced right pubic tubercle fracture, No widening of the pubic symphysis   Right elbow/knee/femur/hip - No fractures or dislocations     A/P: 88y Female with right pubic tubercle fracture. Discussed with patient injury pattern and all questions answered.    - WBAT RLE  - Pain control  - PT/OT  - F/U with Dr. García 1 week following discharge . Please call [phone].  - Please page orthopaedics with any questions or concerns.    ORTHOPAEDIC SURGERY CONSULT NOTE    Reason for Consult: Right pubic tubercle fracture     HPI: 88yFemale presents with pain in right groin following mechanical fall down 1 step onto cement floor. Endorses pain in groin with ambulation. Denies pain at rest. Patient denies head trauma or LOC. States that she had pain initially over right elbow following fall but has since resolved. Denies pain elsewhere. Endorses numbness in toes at baseline.     PAST MEDICAL & SURGICAL HISTORY:  Hypertension      GERD (gastroesophageal reflux disease)      History of surgery  CHOLECYSTECTOMY, HYSTERECTOMY, RIGHT FOOT SURGERY, APPENDECTOMY        Allergies: No Known Drug Allergies  Chlorine wipes  rash, itching (Rash)    Medications: acetaminophen     Tablet .. 650 milliGRAM(s) Oral every 6 hours PRN  atorvastatin 40 milliGRAM(s) Oral at bedtime  enoxaparin Injectable 40 milliGRAM(s) SubCutaneous every 24 hours  ketorolac 0.5% Ophthalmic Solution 1 Drop(s) Both EYES four times a day  lactated ringers. 1000 milliLiter(s) IV Continuous <Continuous>  losartan 100 milliGRAM(s) Oral daily  melatonin 3 milliGRAM(s) Oral at bedtime PRN  morphine  - Injectable 2 milliGRAM(s) IV Push every 4 hours PRN  pantoprazole    Tablet 40 milliGRAM(s) Oral before breakfast      PHYSICAL EXAM:  Vital Signs Last 24 Hrs  T(C): 37.1 (31 Dec 2023 22:12), Max: 37.1 (31 Dec 2023 22:12)  T(F): 98.7 (31 Dec 2023 22:12), Max: 98.7 (31 Dec 2023 22:12)  HR: 81 (01 Jan 2024 05:58) (81 - 82)  BP: 165/79 (01 Jan 2024 05:58) (165/79 - 179/94)  BP(mean): 110 (01 Jan 2024 05:58) (110 - 110)  RR: 18 (31 Dec 2023 22:12) (18 - 18)  SpO2: 100% (31 Dec 2023 22:12) (100% - 100%)    Parameters below as of 31 Dec 2023 22:12  Patient On (Oxygen Delivery Method): room air        Physical Exam:  General: NAD. AAOx3.  Resp: NLB on RA.    RUE:  No open skin or wounds  NTTP shoulder, upper arm, elbow, forearm, wrist or hand  Full ROM at shoulder, elbow, wrist, and   SILT in Ax/M/U/R distributions.  AIN/PIN/U motor intact.  2+ distal pulses with normal cap refill at distal finger tips.     LUE:  No open skin or wounds  NTTP shoulder, upper arm, elbow, forearm, wrist or hand  Full ROM at shoulder, elbow, wrist, and   SILT in Ax/M/U/R distributions.   AIN/PIN/U motor intact.  2+ distal pulses with normal cap refill at distal finger tips.     RLE:  No open skin or wounds  TTP over right groin   NTTP hip, thigh, knee, leg, ankle or foot.  Full ROM at hip, knee, ankle and toes.  No pain with log-roll or axial compression.  Able to actively SLR.  SILT DP/SP/T/Blue/Sa, subjective numbness in toes which is patients baseline   EHL/FHL/TA/Gs motor intact.  2+ DP/PT pulses with normal cap refill distally.  Compartments soft and compressible.     LLE:   No open skin or wounds  NTTP hip, thigh, knee, leg, ankle or foot.   Full ROM at hip, knee, ankle and toes   No pain with log-roll or axial compression  Able to actively SLR.  SILT DP/SP/T/Blue/Sa, subjective numbness in toes which is patients baseline   EHL/FHL/TA/Gs motor intact.  2+ DP/PT pulses with normal cap refill distally.  Compartments soft and compressible. No pain on passive stretch.    Labs:                        13.1   6.43  )-----------( 163      ( 31 Dec 2023 22:39 )             39.5     12-31    140  |  107  |  24<H>  ----------------------------<  128<H>  4.5   |  22  |  0.9    Ca    9.0      31 Dec 2023 22:39    TPro  7.0  /  Alb  3.9  /  TBili  0.2  /  DBili  x   /  AST  24  /  ALT  23  /  AlkPhos  77  12-31    PT/INR - ( 31 Dec 2023 22:39 )   PT: 11.50 sec;   INR: 1.01 ratio         PTT - ( 31 Dec 2023 22:39 )  PTT:29.7 sec    Imaging XR:    Pelvis - Right pubic tubercle fracture   CT pelvis = minimally displaced right pubic tubercle fracture, No widening of the pubic symphysis   Right elbow/knee/femur/hip - No fractures or dislocations     A/P: 88y Female with right pubic tubercle fracture. Discussed with patient injury pattern and all questions answered.    - WBAT RLE  - Pain control  - PT/OT  - F/U with Dr. García 1 week following discharge . Please call [phone].  - Please page orthopaedics with any questions or concerns.    ORTHOPAEDIC SURGERY CONSULT NOTE    Reason for Consult: Right pubic tubercle fracture     HPI: 88yFemale presents with pain in right groin following mechanical fall down 1 step onto cement floor. Endorses pain in groin with ambulation. Denies pain at rest. Patient denies head trauma or LOC. States that she had pain initially over right elbow following fall but has since resolved. Denies pain elsewhere. Endorses numbness in toes at baseline.     PAST MEDICAL & SURGICAL HISTORY:  Hypertension      GERD (gastroesophageal reflux disease)      History of surgery  CHOLECYSTECTOMY, HYSTERECTOMY, RIGHT FOOT SURGERY, APPENDECTOMY        Allergies: No Known Drug Allergies  Chlorine wipes  rash, itching (Rash)    Medications: acetaminophen     Tablet .. 650 milliGRAM(s) Oral every 6 hours PRN  atorvastatin 40 milliGRAM(s) Oral at bedtime  enoxaparin Injectable 40 milliGRAM(s) SubCutaneous every 24 hours  ketorolac 0.5% Ophthalmic Solution 1 Drop(s) Both EYES four times a day  lactated ringers. 1000 milliLiter(s) IV Continuous <Continuous>  losartan 100 milliGRAM(s) Oral daily  melatonin 3 milliGRAM(s) Oral at bedtime PRN  morphine  - Injectable 2 milliGRAM(s) IV Push every 4 hours PRN  pantoprazole    Tablet 40 milliGRAM(s) Oral before breakfast      PHYSICAL EXAM:  Vital Signs Last 24 Hrs  T(C): 37.1 (31 Dec 2023 22:12), Max: 37.1 (31 Dec 2023 22:12)  T(F): 98.7 (31 Dec 2023 22:12), Max: 98.7 (31 Dec 2023 22:12)  HR: 81 (01 Jan 2024 05:58) (81 - 82)  BP: 165/79 (01 Jan 2024 05:58) (165/79 - 179/94)  BP(mean): 110 (01 Jan 2024 05:58) (110 - 110)  RR: 18 (31 Dec 2023 22:12) (18 - 18)  SpO2: 100% (31 Dec 2023 22:12) (100% - 100%)    Parameters below as of 31 Dec 2023 22:12  Patient On (Oxygen Delivery Method): room air        Physical Exam:  General: NAD. AAOx3.  Resp: NLB on RA.    RUE:  No open skin or wounds  NTTP shoulder, upper arm, elbow, forearm, wrist or hand  Full ROM at shoulder, elbow, wrist, and   SILT in Ax/M/U/R distributions.  AIN/PIN/U motor intact.  2+ distal pulses with normal cap refill at distal finger tips.     LUE:  No open skin or wounds  NTTP shoulder, upper arm, elbow, forearm, wrist or hand  Full ROM at shoulder, elbow, wrist, and   SILT in Ax/M/U/R distributions.   AIN/PIN/U motor intact.  2+ distal pulses with normal cap refill at distal finger tips.     RLE:  No open skin or wounds  TTP over right groin   NTTP hip, thigh, knee, leg, ankle or foot.  Full ROM at hip, knee, ankle and toes.  No pain with log-roll or axial compression.  Able to actively SLR.  SILT DP/SP/T/Blue/Sa, subjective numbness in toes which is patients baseline   EHL/FHL/TA/Gs motor intact.  2+ DP/PT pulses with normal cap refill distally.  Compartments soft and compressible.     LLE:   No open skin or wounds  NTTP hip, thigh, knee, leg, ankle or foot.   Full ROM at hip, knee, ankle and toes   No pain with log-roll or axial compression  Able to actively SLR.  SILT DP/SP/T/Blue/Sa, subjective numbness in toes which is patients baseline   EHL/FHL/TA/Gs motor intact.  2+ DP/PT pulses with normal cap refill distally.  Compartments soft and compressible. No pain on passive stretch.    Labs:                        13.1   6.43  )-----------( 163      ( 31 Dec 2023 22:39 )             39.5     12-31    140  |  107  |  24<H>  ----------------------------<  128<H>  4.5   |  22  |  0.9    Ca    9.0      31 Dec 2023 22:39    TPro  7.0  /  Alb  3.9  /  TBili  0.2  /  DBili  x   /  AST  24  /  ALT  23  /  AlkPhos  77  12-31    PT/INR - ( 31 Dec 2023 22:39 )   PT: 11.50 sec;   INR: 1.01 ratio         PTT - ( 31 Dec 2023 22:39 )  PTT:29.7 sec    Imaging XR:    Pelvis - Right pubic tubercle fracture   CT pelvis - minimally displaced right pubic tubercle fracture, No widening of the pubic symphysis   Right elbow/knee/femur/hip - No fractures or dislocations     A/P: 88y Female with right pubic tubercle fracture. Discussed with patient injury pattern and all questions answered.    - WBAT RLE  - Pain control  - PT/OT  - F/U with Dr. García 1 week following discharge . Please call [phone].  - Please page orthopaedics with any questions or concerns.    ORTHOPAEDIC SURGERY CONSULT NOTE    Reason for Consult: Right pubic tubercle fracture     HPI: 88yFemale presents with pain in right groin following mechanical fall down 1 step onto cement floor. Endorses pain in groin with ambulation. Denies pain at rest. Patient denies head trauma or LOC. States that she had pain initially over right elbow following fall but has since resolved. Denies pain elsewhere. Endorses numbness in toes at baseline.     PAST MEDICAL & SURGICAL HISTORY:  Hypertension      GERD (gastroesophageal reflux disease)      History of surgery  CHOLECYSTECTOMY, HYSTERECTOMY, RIGHT FOOT SURGERY, APPENDECTOMY        Allergies: No Known Drug Allergies  Chlorine wipes  rash, itching (Rash)    Medications: acetaminophen     Tablet .. 650 milliGRAM(s) Oral every 6 hours PRN  atorvastatin 40 milliGRAM(s) Oral at bedtime  enoxaparin Injectable 40 milliGRAM(s) SubCutaneous every 24 hours  ketorolac 0.5% Ophthalmic Solution 1 Drop(s) Both EYES four times a day  lactated ringers. 1000 milliLiter(s) IV Continuous <Continuous>  losartan 100 milliGRAM(s) Oral daily  melatonin 3 milliGRAM(s) Oral at bedtime PRN  morphine  - Injectable 2 milliGRAM(s) IV Push every 4 hours PRN  pantoprazole    Tablet 40 milliGRAM(s) Oral before breakfast      PHYSICAL EXAM:  Vital Signs Last 24 Hrs  T(C): 37.1 (31 Dec 2023 22:12), Max: 37.1 (31 Dec 2023 22:12)  T(F): 98.7 (31 Dec 2023 22:12), Max: 98.7 (31 Dec 2023 22:12)  HR: 81 (01 Jan 2024 05:58) (81 - 82)  BP: 165/79 (01 Jan 2024 05:58) (165/79 - 179/94)  BP(mean): 110 (01 Jan 2024 05:58) (110 - 110)  RR: 18 (31 Dec 2023 22:12) (18 - 18)  SpO2: 100% (31 Dec 2023 22:12) (100% - 100%)    Parameters below as of 31 Dec 2023 22:12  Patient On (Oxygen Delivery Method): room air        Physical Exam:  General: NAD. AAOx3.  Resp: NLB on RA.    RUE:  No open skin or wounds  NTTP shoulder, upper arm, elbow, forearm, wrist or hand  Full ROM at shoulder, elbow, wrist, and   SILT in Ax/M/U/R distributions.  AIN/PIN/U motor intact.  2+ distal pulses with normal cap refill at distal finger tips.     LUE:  No open skin or wounds  NTTP shoulder, upper arm, elbow, forearm, wrist or hand  Full ROM at shoulder, elbow, wrist, and   SILT in Ax/M/U/R distributions.   AIN/PIN/U motor intact.  2+ distal pulses with normal cap refill at distal finger tips.     RLE:  No open skin or wounds  TTP over right groin   NTTP hip, thigh, knee, leg, ankle or foot.  Full ROM at hip, knee, ankle and toes.  No pain with log-roll or axial compression.  Able to actively SLR.  SILT DP/SP/T/Blue/Sa, subjective numbness in toes which is patients baseline   EHL/FHL/TA/Gs motor intact.  2+ DP/PT pulses with normal cap refill distally.  Compartments soft and compressible.     LLE:   No open skin or wounds  NTTP hip, thigh, knee, leg, ankle or foot.   Full ROM at hip, knee, ankle and toes   No pain with log-roll or axial compression  Able to actively SLR.  SILT DP/SP/T/Blue/Sa, subjective numbness in toes which is patients baseline   EHL/FHL/TA/Gs motor intact.  2+ DP/PT pulses with normal cap refill distally.  Compartments soft and compressible. No pain on passive stretch.    Labs:                        13.1   6.43  )-----------( 163      ( 31 Dec 2023 22:39 )             39.5     12-31    140  |  107  |  24<H>  ----------------------------<  128<H>  4.5   |  22  |  0.9    Ca    9.0      31 Dec 2023 22:39    TPro  7.0  /  Alb  3.9  /  TBili  0.2  /  DBili  x   /  AST  24  /  ALT  23  /  AlkPhos  77  12-31    PT/INR - ( 31 Dec 2023 22:39 )   PT: 11.50 sec;   INR: 1.01 ratio         PTT - ( 31 Dec 2023 22:39 )  PTT:29.7 sec    Imaging XR:    Pelvis - Right pubic tubercle fracture   CT pelvis - minimally displaced right pubic tubercle fracture, No widening of the pubic symphysis   Right elbow/knee/femur/hip - No fractures or dislocations     A/P: 88y Female with right pubic tubercle fracture. Discussed with patient injury pattern and all questions answered.    - WBAT RLE  - Pain control  - PT/OT  - F/U with Dr. García 1 week following discharge . Please call [phone].  - Please page orthopaedics with any questions or concerns.

## 2024-01-01 NOTE — ED PROVIDER NOTE - CARE PLAN
Luz Elena,  Bone density test shows some decrease in bone density (osteopenia), but not bad enough to be considered osteoporosis.  People with osteopenia should work on taking in 1200 mg of calcium with vitamin D daily. They should also be getting daily weight bearing exercise such as walking, running, lifting weights.  We could consider medication to protect your bones if you'd desire, but I would focus on the lifestyle treatment as described above.  See me if you wish to discuss possible medication.  Let's recheck bones in 3-5 yrs.    Kiara    Principal Discharge DX:	Pubic tubercle fracture   1

## 2024-01-01 NOTE — ED PROVIDER NOTE - CLINICAL SUMMARY MEDICAL DECISION MAKING FREE TEXT BOX
88-year-old female history of hypertension, hyperlipidemia presents to the ED complaining of right groin/hip pain after mechanical fall down 1 step while trying to take the garbage out prior to arrival.  Landed on the right hip but did not hit her head, no LOC, no anticoagulation.  Has been unable to bear weight since the fall.  Tetanus is up-to-date.  No other associated symptoms.    On exam, vitals reviewed.  Patient is well-appearing, no acute distress, head is normocephalic and atraumatic, no C or T or L-spine tenderness palpation, no chest wall tenderness palpation.  Heart is regular rate and rhythm, lungs are clear to auscultation bilaterally, abdomen is soft and nontender.  She has a stable pelvis with pain along the right groin and right lateral hip.  Neurovascularly intact.  Also with an abrasion to the right elbow.    Imaging was obtained which demonstrated a fracture of the pubic tubercle.  Ortho was consulted, no acute surgical intervention.  Patient was admitted to the hospital for pain control and physical therapy/rehab.

## 2024-01-01 NOTE — H&P ADULT - ATTENDING COMMENTS
HPI as above.  Interval history: Pt seen and examined at bedside. No cp or sob. Pt complained of abd pain. Pt states that she usually takes a nexium in the am prorio to eating and after she eats she doesn't get the pain. Pt has been NPO, doet changed. Also states that she felt off after getting the morphine   Vital Signs (24 Hrs):  T(C): 37.1 (01-01-24 @ 08:13), Max: 37.1 (12-31-23 @ 22:12)  HR: 79 (01-01-24 @ 08:13) (79 - 82)  BP: 165/79 (01-01-24 @ 08:13) (165/79 - 179/94)  RR: 18 (01-01-24 @ 08:13) (18 - 18)  SpO2: 96% (01-01-24 @ 08:13) (96% - 100%)  Wt(kg): --  Daily Height in cm: 165.1 (31 Dec 2023 22:12)    Daily     I&O's Summary    PHYSICAL EXAM:  GENERAL: NAD  HEAD:  Atraumatic, Normocephalic  EYES: EOMI, PERRLA, conjunctiva and sclera clear  NECK: Supple, No JVD  CHEST/LUNG: Clear to auscultation bilaterally; No wheeze  HEART: Regular rate and rhythm; No murmurs, rubs, or gallops  ABDOMEN: Soft, Nontender, Nondistended; Bowel sounds present  EXTREMITIES:  2+ Peripheral Pulses, No clubbing, cyanosis, or edema  PSYCH: AAOx3  NEUROLOGY: non-focal  SKIN: No rashes or lesions  Labs reviewed  Imaging reviewed independently and reviewed read  < from: CT Abdomen and Pelvis No Cont (01.01.24 @ 01:24) >    IMPRESSION:  Right pubic tubercle comminuted fracture without significant displacement   extending towards the medial superior ramus. Associated right pelvic   blood.    EKG reviewed independently and reviewed read    Plan  88 yrs old F with PMHx of HTN, HLD, presenting from home with right hip pain status post fall.     #Rt. pubic tubercle fracture s/p Mechanical fall  - CT showed Right pubic tubercle comminuted fracture without significant displacement extending towards the medial superior ramus. Associated right pelvic blood.  - F/u x-ray Rt. elbow  - Follow ortho recs  - NPO for procedure, Pre-op labs  - dc morphine, added oxycodone prn , tylenol  - DVT ppx    #HTN  - BP uncontrolled likely due to pain  - c/w Pain control   - c/w Losartan 100mg     #abd pain likley 2/2 gerd. ppi. kub     #HLD  - c/w Lipitor    #Recent cataract Sx about 2 weeks ago  - c/w Ketorolac eye gtt  -Med rec confirmed with the patient.      #Progress Note Handoff  Pending (specify):  as above  Family discussion: house staff updated pt family  Disposition: as above   Decision to admit the pt is based on acuity as above

## 2024-01-01 NOTE — H&P ADULT - HISTORY OF PRESENT ILLNESS
88 yrs old F with PMHx of HTN, HLD, presenting from home with right hip pain status post fall. Patient had mechanical trip and fall down 1 step while trying to take the garbage out.  She landed on right hip.  No head trauma/injury.  Patient has not been able to bear weight/ambulate since fall. Patient notes small abrasion to right elbow.  Patient denies any neck pain, back pain, anticoagulant use, extremity numbness/tingling, abdominal pain, bowel or bladder incontinence. She endorsed Rt. hip pain and limited movement of RLE. She denies hitting head/LOC/anticoag use. Denies HA, dizziness, NVD, fever, SOB, chest pain, abdominal pain, change in urination and change in bowel habits.    In ED, pt. was hypertensive /94 likely due to pain  Labs were unremarkable  CT abd/pelvis: Right pubic tubercle comminuted fracture without significant displacement extending towards the medial superior ramus. Associated right pelvic blood.    Seen by ortho in ED.    Pt. is being admitted to medicine for further management of the Rt pubic fracture.     88 yrs old F with PMHx of HTN, HLD, presenting from home with right hip pain status post fall. Patient had mechanical trip and fall down 1 step while trying to take the garbage out.  She landed on right hip.  No head trauma/injury.  Patient has not been able to bear weight/ambulate since fall. Patient notes small abrasion to right elbow.  Patient denies any neck pain, back pain, anticoagulant use, extremity numbness/tingling, abdominal pain, bowel or bladder incontinence. She endorsed Rt. hip pain and limited movement of RLE. She denies hitting head/LOC/anticoag use. Denies HA, dizziness, NVD, fever, SOB, chest pain, abdominal pain, change in urination and change in bowel habits.    In ED, pt. was hypertensive /94 likely due to pain  Labs were unremarkable  CT abd/pelvis: Right pubic tubercle comminuted fracture without significant displacement extending towards the medial superior ramus. Associated right pelvic blood.    Ortho team consulted in ED.    Pt. is being admitted to medicine for further management of the Rt pubic fracture.

## 2024-01-02 LAB
ANION GAP SERPL CALC-SCNC: 12 MMOL/L — SIGNIFICANT CHANGE UP (ref 7–14)
ANION GAP SERPL CALC-SCNC: 12 MMOL/L — SIGNIFICANT CHANGE UP (ref 7–14)
BUN SERPL-MCNC: 13 MG/DL — SIGNIFICANT CHANGE UP (ref 10–20)
BUN SERPL-MCNC: 13 MG/DL — SIGNIFICANT CHANGE UP (ref 10–20)
CALCIUM SERPL-MCNC: 8.9 MG/DL — SIGNIFICANT CHANGE UP (ref 8.4–10.5)
CALCIUM SERPL-MCNC: 8.9 MG/DL — SIGNIFICANT CHANGE UP (ref 8.4–10.5)
CHLORIDE SERPL-SCNC: 103 MMOL/L — SIGNIFICANT CHANGE UP (ref 98–110)
CHLORIDE SERPL-SCNC: 103 MMOL/L — SIGNIFICANT CHANGE UP (ref 98–110)
CO2 SERPL-SCNC: 22 MMOL/L — SIGNIFICANT CHANGE UP (ref 17–32)
CO2 SERPL-SCNC: 22 MMOL/L — SIGNIFICANT CHANGE UP (ref 17–32)
CREAT SERPL-MCNC: 0.7 MG/DL — SIGNIFICANT CHANGE UP (ref 0.7–1.5)
CREAT SERPL-MCNC: 0.7 MG/DL — SIGNIFICANT CHANGE UP (ref 0.7–1.5)
EGFR: 84 ML/MIN/1.73M2 — SIGNIFICANT CHANGE UP
EGFR: 84 ML/MIN/1.73M2 — SIGNIFICANT CHANGE UP
GLUCOSE SERPL-MCNC: 184 MG/DL — HIGH (ref 70–99)
GLUCOSE SERPL-MCNC: 184 MG/DL — HIGH (ref 70–99)
HCT VFR BLD CALC: 39.3 % — SIGNIFICANT CHANGE UP (ref 37–47)
HCT VFR BLD CALC: 39.3 % — SIGNIFICANT CHANGE UP (ref 37–47)
HGB BLD-MCNC: 13.2 G/DL — SIGNIFICANT CHANGE UP (ref 12–16)
HGB BLD-MCNC: 13.2 G/DL — SIGNIFICANT CHANGE UP (ref 12–16)
MAGNESIUM SERPL-MCNC: 1.7 MG/DL — LOW (ref 1.8–2.4)
MAGNESIUM SERPL-MCNC: 1.7 MG/DL — LOW (ref 1.8–2.4)
MCHC RBC-ENTMCNC: 30.2 PG — SIGNIFICANT CHANGE UP (ref 27–31)
MCHC RBC-ENTMCNC: 30.2 PG — SIGNIFICANT CHANGE UP (ref 27–31)
MCHC RBC-ENTMCNC: 33.6 G/DL — SIGNIFICANT CHANGE UP (ref 32–37)
MCHC RBC-ENTMCNC: 33.6 G/DL — SIGNIFICANT CHANGE UP (ref 32–37)
MCV RBC AUTO: 89.9 FL — SIGNIFICANT CHANGE UP (ref 81–99)
MCV RBC AUTO: 89.9 FL — SIGNIFICANT CHANGE UP (ref 81–99)
NRBC # BLD: 0 /100 WBCS — SIGNIFICANT CHANGE UP (ref 0–0)
NRBC # BLD: 0 /100 WBCS — SIGNIFICANT CHANGE UP (ref 0–0)
PLATELET # BLD AUTO: 154 K/UL — SIGNIFICANT CHANGE UP (ref 130–400)
PLATELET # BLD AUTO: 154 K/UL — SIGNIFICANT CHANGE UP (ref 130–400)
PMV BLD: 9.7 FL — SIGNIFICANT CHANGE UP (ref 7.4–10.4)
PMV BLD: 9.7 FL — SIGNIFICANT CHANGE UP (ref 7.4–10.4)
POTASSIUM SERPL-MCNC: 3.9 MMOL/L — SIGNIFICANT CHANGE UP (ref 3.5–5)
POTASSIUM SERPL-MCNC: 3.9 MMOL/L — SIGNIFICANT CHANGE UP (ref 3.5–5)
POTASSIUM SERPL-SCNC: 3.9 MMOL/L — SIGNIFICANT CHANGE UP (ref 3.5–5)
POTASSIUM SERPL-SCNC: 3.9 MMOL/L — SIGNIFICANT CHANGE UP (ref 3.5–5)
RBC # BLD: 4.37 M/UL — SIGNIFICANT CHANGE UP (ref 4.2–5.4)
RBC # BLD: 4.37 M/UL — SIGNIFICANT CHANGE UP (ref 4.2–5.4)
RBC # FLD: 12.7 % — SIGNIFICANT CHANGE UP (ref 11.5–14.5)
RBC # FLD: 12.7 % — SIGNIFICANT CHANGE UP (ref 11.5–14.5)
SODIUM SERPL-SCNC: 137 MMOL/L — SIGNIFICANT CHANGE UP (ref 135–146)
SODIUM SERPL-SCNC: 137 MMOL/L — SIGNIFICANT CHANGE UP (ref 135–146)
WBC # BLD: 6.32 K/UL — SIGNIFICANT CHANGE UP (ref 4.8–10.8)
WBC # BLD: 6.32 K/UL — SIGNIFICANT CHANGE UP (ref 4.8–10.8)
WBC # FLD AUTO: 6.32 K/UL — SIGNIFICANT CHANGE UP (ref 4.8–10.8)
WBC # FLD AUTO: 6.32 K/UL — SIGNIFICANT CHANGE UP (ref 4.8–10.8)

## 2024-01-02 PROCEDURE — 93880 EXTRACRANIAL BILAT STUDY: CPT | Mod: 26

## 2024-01-02 PROCEDURE — 93010 ELECTROCARDIOGRAM REPORT: CPT

## 2024-01-02 PROCEDURE — 99232 SBSQ HOSP IP/OBS MODERATE 35: CPT

## 2024-01-02 RX ORDER — METHYLPREDNISOLONE 4 MG
500 TABLET ORAL DAILY
Refills: 0 | Status: COMPLETED | OUTPATIENT
Start: 2024-01-02 | End: 2024-01-07

## 2024-01-02 RX ORDER — MAGNESIUM SULFATE 500 MG/ML
2 VIAL (ML) INJECTION ONCE
Refills: 0 | Status: COMPLETED | OUTPATIENT
Start: 2024-01-02 | End: 2024-01-02

## 2024-01-02 RX ADMIN — ATORVASTATIN CALCIUM 40 MILLIGRAM(S): 80 TABLET, FILM COATED ORAL at 11:44

## 2024-01-02 RX ADMIN — Medication 1 DROP(S): at 06:17

## 2024-01-02 RX ADMIN — PANTOPRAZOLE SODIUM 40 MILLIGRAM(S): 20 TABLET, DELAYED RELEASE ORAL at 06:17

## 2024-01-02 RX ADMIN — LOSARTAN POTASSIUM 100 MILLIGRAM(S): 100 TABLET, FILM COATED ORAL at 06:17

## 2024-01-02 RX ADMIN — Medication 1 DROP(S): at 17:05

## 2024-01-02 RX ADMIN — Medication 1 DROP(S): at 22:13

## 2024-01-02 RX ADMIN — Medication 25 GRAM(S): at 11:44

## 2024-01-02 RX ADMIN — ENOXAPARIN SODIUM 40 MILLIGRAM(S): 100 INJECTION SUBCUTANEOUS at 17:05

## 2024-01-02 RX ADMIN — Medication 1 DROP(S): at 11:43

## 2024-01-02 NOTE — PATIENT PROFILE ADULT - FALL HARM RISK - HARM RISK INTERVENTIONS
Assistance with ambulation/Assistance OOB with selected safe patient handling equipment/Communicate Risk of Fall with Harm to all staff/Discuss with provider need for PT consult/Monitor gait and stability/Provide patient with walking aids - walker, cane, crutches/Reinforce activity limits and safety measures with patient and family/Tailored Fall Risk Interventions/Visual Cue: Yellow wristband and red socks/Bed in lowest position, wheels locked, appropriate side rails in place/Call bell, personal items and telephone in reach/Instruct patient to call for assistance before getting out of bed or chair/Non-slip footwear when patient is out of bed/[place] to call system/Physically safe environment - no spills, clutter or unnecessary equipment/Purposeful Proactive Rounding/Room/bathroom lighting operational, light cord in reach Assistance with ambulation/Assistance OOB with selected safe patient handling equipment/Communicate Risk of Fall with Harm to all staff/Discuss with provider need for PT consult/Monitor gait and stability/Provide patient with walking aids - walker, cane, crutches/Reinforce activity limits and safety measures with patient and family/Tailored Fall Risk Interventions/Visual Cue: Yellow wristband and red socks/Bed in lowest position, wheels locked, appropriate side rails in place/Call bell, personal items and telephone in reach/Instruct patient to call for assistance before getting out of bed or chair/Non-slip footwear when patient is out of bed/[place] to call system/Physically safe environment - no spills, clutter or unnecessary equipment/Purposeful Proactive Rounding/Room/bathroom lighting operational, light cord in reach

## 2024-01-02 NOTE — PHYSICAL THERAPY INITIAL EVALUATION ADULT - GAIT TRAINING, PT EVAL
Pt will ambulate using RW or least restrictive AD for 100 ft with supervision by discharge to facilitate return to PLOF. Pt will negotiate 10 steps using 1 HR under supervision

## 2024-01-02 NOTE — PHYSICAL THERAPY INITIAL EVALUATION ADULT - ADDITIONAL COMMENTS
Pt resides with daughter in a private house using no steps to enter and 10 step to access her apt. Pt used to be independent with overall functional mobility, able to ambulate using No AD at baseline

## 2024-01-02 NOTE — CONSULT NOTE ADULT - ASSESSMENT
IMPRESSION: Rehab of right pelvic fx / right elbow abrasion, s/p fall /  HTN, HLD, recent R THR    PRECAUTIONS: [   ] Cardiac  [   ] Respiratory  [   ] Seizures [   ] Contact Isolation  [   ] Droplet Isolation  [   ] Other    Weight Bearing Status: WBAT RLE    RECOMMENDATION: aggressive pain management     Out of Bed to Chair     DVT/Decubiti Prophylaxis    REHAB PLAN:     [   x ] Bedside P/T 3-5 times a week   [  x  ]   Bedside O/T  2-3 times a week             [    ] Speech Therapy               [    ]  No Rehab Therapy Indicated   Conditioning/ROM                                    ADL  Bed Mobility                                               Conditioning/ROM  Transfers                                                     Bed Mobility  Sitting /Standing Balance                         Transfers                                        Gait Training                                               Sitting/Standing Balance  Stair Training [   ]Applicable                    Home equipment Eval                                                                        Splinting  [   ] Only      GOALS:   ADL   [  x  ]   Independent                    Transfers  [   x ] Independent                          Ambulation  [ x   ] Independent     [   x  ] With device                            [    ]  CG                                                         [    ]  CG                                                                  [    ] CG                            [    ] Min A                                                   [    ] Min A                                                              [    ] Min  A          DISCHARGE PLAN:   [    ]  Good candidate for Intensive Rehabilitation/Hospital based                                             Will tolerate 3hrs Intensive Rehab Daily                                       [     ]  Short Term Rehab in Skilled Nursing Facility                                       [     ]  Home with Outpatient or  services                                         [  x   ]  Possible Candidate for Intensive Hospital based Rehab

## 2024-01-02 NOTE — PHYSICAL THERAPY INITIAL EVALUATION ADULT - PERTINENT HX OF CURRENT PROBLEM, REHAB EVAL
88 yrs old F with PMHx of HTN, HLD, presenting from home with right hip pain status post fall. Patient had mechanical trip and fall down 1 step while trying to take the garbage out.  She landed on right hip.  No head trauma/injury.  Patient has not been able to bear weight/ambulate since fall. Patient notes small abrasion to right elbow.  Patient denies any neck pain, back pain, anticoagulant use, extremity numbness/tingling, abdominal pain, bowel or bladder incontinence. She endorsed Rt. hip pain and limited movement of RLE. She denies hitting head/LOC/anticoag use. Denies HA, dizziness, NVD, fever, SOB, chest pain, abdominal pain, change in urination and change in bowel habits.

## 2024-01-02 NOTE — CONSULT NOTE ADULT - SUBJECTIVE AND OBJECTIVE BOX
HPI:  88 yrs old F with PMHx of HTN, HLD, presenting from home with right hip pain status post fall. Patient had mechanical trip and fall down 1 step while trying to take the garbage out.  She landed on right hip.  No head trauma/injury.  Patient has not been able to bear weight/ambulate since fall. Patient notes small abrasion to right elbow.  Patient denies any neck pain, back pain, anticoagulant use, extremity numbness/tingling, abdominal pain, bowel or bladder incontinence. She endorsed Rt. hip pain and limited movement of RLE. She denies hitting head/LOC/anticoag use. Denies HA, dizziness, NVD, fever, SOB, chest pain, abdominal pain, change in urination and change in bowel habits.    In ED, pt. was hypertensive /94 likely due to pain  Labs were unremarkable  CT abd/pelvis: Right pubic tubercle comminuted fracture without significant displacement extending towards the medial superior ramus. Associated right pelvic blood.    Ortho team consulted in ED. - WBAT RLE    Pt. is being admitted to medicine for further management of the Rt pubic fracture.        PAST MEDICAL & SURGICAL HISTORY:  Hypertension      GERD (gastroesophageal reflux disease)      History of surgery  CHOLECYSTECTOMY, HYSTERECTOMY, RIGHT FOOT SURGERY, APPENDECTOMY          Hospital Course:    TODAY'S SUBJECTIVE & REVIEW OF SYMPTOMS:     Constitutional WNL   Cardio WNL   Resp WNL   GI WNL  Heme WNL  Endo WNL  Skin WNL  MSK right hip pain   Neuro WNL  Cognitive WNL  Psych WNL      MEDICATIONS  (STANDING):  atorvastatin 40 milliGRAM(s) Oral at bedtime  enoxaparin Injectable 40 milliGRAM(s) SubCutaneous every 24 hours  ketorolac 0.5% Ophthalmic Solution 1 Drop(s) Both EYES four times a day  lactated ringers. 1000 milliLiter(s) (75 mL/Hr) IV Continuous <Continuous>  losartan 100 milliGRAM(s) Oral daily  pantoprazole    Tablet 40 milliGRAM(s) Oral before breakfast    MEDICATIONS  (PRN):  acetaminophen     Tablet .. 650 milliGRAM(s) Oral every 6 hours PRN Temp greater or equal to 38C (100.4F), Mild Pain (1 - 3)  aluminum hydroxide/magnesium hydroxide/simethicone Suspension 30 milliLiter(s) Oral every 6 hours PRN Dyspepsia  melatonin 3 milliGRAM(s) Oral at bedtime PRN Insomnia  oxyCODONE    IR 5 milliGRAM(s) Oral every 6 hours PRN Severe Pain (7 - 10)      FAMILY HISTORY:  No pertinent family history in first degree relatives        Allergies    No Known Drug Allergies  Chlorine wipes  rash, itching (Rash)    Intolerances        SOCIAL HISTORY:    [  ] Etoh  [  ] Smoking  [  ] Substance abuse     Home Environment:  [   ] Home Alone  [  x ] Lives with Family  [   ] Home Health Aid    Dwelling:  [   ] Apartment  [ x  ] Private House  [   ] Adult Home  [   ] Skilled Nursing Facility      [   ] Short Term  [   ] Long Term  [  x ] Stairs       Elevator [   ]    FUNCTIONAL STATUS PTA: (Check all that apply)  Ambulation: [  x  ]Independent    [   ] Dependent     [   ] Non-Ambulatory  Assistive Device: [   ] SA Cane  [   ]  Q Cane  [   ] Walker  [   ]  Wheelchair  ADL : [ x  ] Independent  [    ]  Dependent       Vital Signs Last 24 Hrs  T(C): 36.6 (02 Jan 2024 07:27), Max: 37.2 (01 Jan 2024 23:43)  T(F): 97.9 (02 Jan 2024 07:27), Max: 98.9 (01 Jan 2024 23:43)  HR: 89 (02 Jan 2024 07:27) (84 - 91)  BP: 179/84 (02 Jan 2024 07:27) (140/63 - 179/84)  BP(mean): 120 (02 Jan 2024 07:27) (120 - 120)  RR: 18 (02 Jan 2024 07:27) (18 - 18)  SpO2: 98% (02 Jan 2024 07:27) (96% - 98%)    Parameters below as of 02 Jan 2024 07:27  Patient On (Oxygen Delivery Method): room air          PHYSICAL EXAM: Awake & Alert  GENERAL: NAD  HEAD:  Normocephalic  CHEST/LUNG: Clear   HEART: S1S2+  ABDOMEN: Soft, Nontender  EXTREMITIES:  no calf tenderness, right elbow abrasion     NERVOUS SYSTEM:  Cranial Nerves 2-12 intact [   ] Abnormal  [   ]  ROM: WFL all extremities [   ]  Abnormal [ x  ]limite RLE  Motor Strength: WFL all extremities  [   ]  Abnormal [x   ]limited RLE  Sensation: intact to light touch [ x  ] Abnormal [   ]    FUNCTIONAL STATUS:  Bed Mobility: Independent [   ]  Supervision [   ]  Needs Assistance [x   ]  N/A [   ]  Transfers: Independent [   ]  Supervision [   ]  Needs Assistance [   ]  N/A [   ]   Ambulation: Independent [   ]  Supervision [   ]  Needs Assistance [   ]  N/A [   ]  ADL: Independent [   ] Requires Assistance [   ] N/A [   ]      LABS:                        13.2   6.32  )-----------( 154      ( 02 Jan 2024 09:51 )             39.3     01-02    137  |  103  |  13  ----------------------------<  184<H>  3.9   |  22  |  0.7    Ca    8.9      02 Jan 2024 09:51  Mg     1.7     01-02    TPro  6.6  /  Alb  3.7  /  TBili  0.5  /  DBili  x   /  AST  23  /  ALT  20  /  AlkPhos  72  01-01    PT/INR - ( 31 Dec 2023 22:39 )   PT: 11.50 sec;   INR: 1.01 ratio         PTT - ( 31 Dec 2023 22:39 )  PTT:29.7 sec  Urinalysis Basic - ( 02 Jan 2024 09:51 )    Color: x / Appearance: x / SG: x / pH: x  Gluc: 184 mg/dL / Ketone: x  / Bili: x / Urobili: x   Blood: x / Protein: x / Nitrite: x   Leuk Esterase: x / RBC: x / WBC x   Sq Epi: x / Non Sq Epi: x / Bacteria: x        RADIOLOGY & ADDITIONAL STUDIES:

## 2024-01-02 NOTE — PHYSICAL THERAPY INITIAL EVALUATION ADULT - GENERAL OBSERVATIONS, REHAB EVAL
8:20-9:05. chart reviewed. Pt received semi-burden at B/S, alert, oriented, able to follow one step instructions and agreeable to PT evaluation.  [place] provided with super ear. + primafit, denies pain at rest CC R groin/back pain 5/10 during movement, initial vitals 144/84. CC dizziness during ambulation. Pt return back to bed in Trendelenburg position /67 HR 88. RN was present. 8:20-9:05. chart reviewed. Pt received semi-burden at B/S, alert, oriented, able to follow one step instructions and agreeable to PT evaluation.  [place] provided with super ear. + primafit, denies pain at rest CC R groin/back pain 5/10 during movement, initial vitals 144/84. CC dizziness during ambulation. Pt return back to bed in Trendelenburg position /67 HR 88. RN was present.

## 2024-01-03 ENCOUNTER — TRANSCRIPTION ENCOUNTER (OUTPATIENT)
Age: 89
End: 2024-01-03

## 2024-01-03 LAB
ALBUMIN SERPL ELPH-MCNC: 3.8 G/DL — SIGNIFICANT CHANGE UP (ref 3.5–5.2)
ALBUMIN SERPL ELPH-MCNC: 3.8 G/DL — SIGNIFICANT CHANGE UP (ref 3.5–5.2)
ALP SERPL-CCNC: 73 U/L — SIGNIFICANT CHANGE UP (ref 30–115)
ALP SERPL-CCNC: 73 U/L — SIGNIFICANT CHANGE UP (ref 30–115)
ALT FLD-CCNC: 18 U/L — SIGNIFICANT CHANGE UP (ref 0–41)
ALT FLD-CCNC: 18 U/L — SIGNIFICANT CHANGE UP (ref 0–41)
ANION GAP SERPL CALC-SCNC: 10 MMOL/L — SIGNIFICANT CHANGE UP (ref 7–14)
ANION GAP SERPL CALC-SCNC: 10 MMOL/L — SIGNIFICANT CHANGE UP (ref 7–14)
AST SERPL-CCNC: 20 U/L — SIGNIFICANT CHANGE UP (ref 0–41)
AST SERPL-CCNC: 20 U/L — SIGNIFICANT CHANGE UP (ref 0–41)
BASOPHILS # BLD AUTO: 0.02 K/UL — SIGNIFICANT CHANGE UP (ref 0–0.2)
BASOPHILS # BLD AUTO: 0.02 K/UL — SIGNIFICANT CHANGE UP (ref 0–0.2)
BASOPHILS NFR BLD AUTO: 0.4 % — SIGNIFICANT CHANGE UP (ref 0–1)
BASOPHILS NFR BLD AUTO: 0.4 % — SIGNIFICANT CHANGE UP (ref 0–1)
BILIRUB SERPL-MCNC: 0.9 MG/DL — SIGNIFICANT CHANGE UP (ref 0.2–1.2)
BILIRUB SERPL-MCNC: 0.9 MG/DL — SIGNIFICANT CHANGE UP (ref 0.2–1.2)
BUN SERPL-MCNC: 19 MG/DL — SIGNIFICANT CHANGE UP (ref 10–20)
BUN SERPL-MCNC: 19 MG/DL — SIGNIFICANT CHANGE UP (ref 10–20)
CALCIUM SERPL-MCNC: 9.1 MG/DL — SIGNIFICANT CHANGE UP (ref 8.4–10.5)
CALCIUM SERPL-MCNC: 9.1 MG/DL — SIGNIFICANT CHANGE UP (ref 8.4–10.5)
CHLORIDE SERPL-SCNC: 103 MMOL/L — SIGNIFICANT CHANGE UP (ref 98–110)
CHLORIDE SERPL-SCNC: 103 MMOL/L — SIGNIFICANT CHANGE UP (ref 98–110)
CO2 SERPL-SCNC: 26 MMOL/L — SIGNIFICANT CHANGE UP (ref 17–32)
CO2 SERPL-SCNC: 26 MMOL/L — SIGNIFICANT CHANGE UP (ref 17–32)
CREAT SERPL-MCNC: 0.8 MG/DL — SIGNIFICANT CHANGE UP (ref 0.7–1.5)
CREAT SERPL-MCNC: 0.8 MG/DL — SIGNIFICANT CHANGE UP (ref 0.7–1.5)
EGFR: 71 ML/MIN/1.73M2 — SIGNIFICANT CHANGE UP
EGFR: 71 ML/MIN/1.73M2 — SIGNIFICANT CHANGE UP
EOSINOPHIL # BLD AUTO: 0.07 K/UL — SIGNIFICANT CHANGE UP (ref 0–0.7)
EOSINOPHIL # BLD AUTO: 0.07 K/UL — SIGNIFICANT CHANGE UP (ref 0–0.7)
EOSINOPHIL NFR BLD AUTO: 1.4 % — SIGNIFICANT CHANGE UP (ref 0–8)
EOSINOPHIL NFR BLD AUTO: 1.4 % — SIGNIFICANT CHANGE UP (ref 0–8)
GLUCOSE BLDC GLUCOMTR-MCNC: 112 MG/DL — HIGH (ref 70–99)
GLUCOSE BLDC GLUCOMTR-MCNC: 112 MG/DL — HIGH (ref 70–99)
GLUCOSE BLDC GLUCOMTR-MCNC: 125 MG/DL — HIGH (ref 70–99)
GLUCOSE BLDC GLUCOMTR-MCNC: 125 MG/DL — HIGH (ref 70–99)
GLUCOSE SERPL-MCNC: 107 MG/DL — HIGH (ref 70–99)
GLUCOSE SERPL-MCNC: 107 MG/DL — HIGH (ref 70–99)
HCT VFR BLD CALC: 37.8 % — SIGNIFICANT CHANGE UP (ref 37–47)
HCT VFR BLD CALC: 37.8 % — SIGNIFICANT CHANGE UP (ref 37–47)
HGB BLD-MCNC: 12.5 G/DL — SIGNIFICANT CHANGE UP (ref 12–16)
HGB BLD-MCNC: 12.5 G/DL — SIGNIFICANT CHANGE UP (ref 12–16)
IMM GRANULOCYTES NFR BLD AUTO: 0.2 % — SIGNIFICANT CHANGE UP (ref 0.1–0.3)
IMM GRANULOCYTES NFR BLD AUTO: 0.2 % — SIGNIFICANT CHANGE UP (ref 0.1–0.3)
LYMPHOCYTES # BLD AUTO: 1.03 K/UL — LOW (ref 1.2–3.4)
LYMPHOCYTES # BLD AUTO: 1.03 K/UL — LOW (ref 1.2–3.4)
LYMPHOCYTES # BLD AUTO: 19.9 % — LOW (ref 20.5–51.1)
LYMPHOCYTES # BLD AUTO: 19.9 % — LOW (ref 20.5–51.1)
MAGNESIUM SERPL-MCNC: 2.1 MG/DL — SIGNIFICANT CHANGE UP (ref 1.8–2.4)
MAGNESIUM SERPL-MCNC: 2.1 MG/DL — SIGNIFICANT CHANGE UP (ref 1.8–2.4)
MCHC RBC-ENTMCNC: 30.3 PG — SIGNIFICANT CHANGE UP (ref 27–31)
MCHC RBC-ENTMCNC: 30.3 PG — SIGNIFICANT CHANGE UP (ref 27–31)
MCHC RBC-ENTMCNC: 33.1 G/DL — SIGNIFICANT CHANGE UP (ref 32–37)
MCHC RBC-ENTMCNC: 33.1 G/DL — SIGNIFICANT CHANGE UP (ref 32–37)
MCV RBC AUTO: 91.7 FL — SIGNIFICANT CHANGE UP (ref 81–99)
MCV RBC AUTO: 91.7 FL — SIGNIFICANT CHANGE UP (ref 81–99)
MONOCYTES # BLD AUTO: 0.65 K/UL — HIGH (ref 0.1–0.6)
MONOCYTES # BLD AUTO: 0.65 K/UL — HIGH (ref 0.1–0.6)
MONOCYTES NFR BLD AUTO: 12.5 % — HIGH (ref 1.7–9.3)
MONOCYTES NFR BLD AUTO: 12.5 % — HIGH (ref 1.7–9.3)
NEUTROPHILS # BLD AUTO: 3.4 K/UL — SIGNIFICANT CHANGE UP (ref 1.4–6.5)
NEUTROPHILS # BLD AUTO: 3.4 K/UL — SIGNIFICANT CHANGE UP (ref 1.4–6.5)
NEUTROPHILS NFR BLD AUTO: 65.6 % — SIGNIFICANT CHANGE UP (ref 42.2–75.2)
NEUTROPHILS NFR BLD AUTO: 65.6 % — SIGNIFICANT CHANGE UP (ref 42.2–75.2)
NRBC # BLD: 0 /100 WBCS — SIGNIFICANT CHANGE UP (ref 0–0)
NRBC # BLD: 0 /100 WBCS — SIGNIFICANT CHANGE UP (ref 0–0)
PLATELET # BLD AUTO: 150 K/UL — SIGNIFICANT CHANGE UP (ref 130–400)
PLATELET # BLD AUTO: 150 K/UL — SIGNIFICANT CHANGE UP (ref 130–400)
PMV BLD: 9.8 FL — SIGNIFICANT CHANGE UP (ref 7.4–10.4)
PMV BLD: 9.8 FL — SIGNIFICANT CHANGE UP (ref 7.4–10.4)
POTASSIUM SERPL-MCNC: 4.9 MMOL/L — SIGNIFICANT CHANGE UP (ref 3.5–5)
POTASSIUM SERPL-MCNC: 4.9 MMOL/L — SIGNIFICANT CHANGE UP (ref 3.5–5)
POTASSIUM SERPL-SCNC: 4.9 MMOL/L — SIGNIFICANT CHANGE UP (ref 3.5–5)
POTASSIUM SERPL-SCNC: 4.9 MMOL/L — SIGNIFICANT CHANGE UP (ref 3.5–5)
PROT SERPL-MCNC: 6.9 G/DL — SIGNIFICANT CHANGE UP (ref 6–8)
PROT SERPL-MCNC: 6.9 G/DL — SIGNIFICANT CHANGE UP (ref 6–8)
RBC # BLD: 4.12 M/UL — LOW (ref 4.2–5.4)
RBC # BLD: 4.12 M/UL — LOW (ref 4.2–5.4)
RBC # FLD: 12.8 % — SIGNIFICANT CHANGE UP (ref 11.5–14.5)
RBC # FLD: 12.8 % — SIGNIFICANT CHANGE UP (ref 11.5–14.5)
SODIUM SERPL-SCNC: 139 MMOL/L — SIGNIFICANT CHANGE UP (ref 135–146)
SODIUM SERPL-SCNC: 139 MMOL/L — SIGNIFICANT CHANGE UP (ref 135–146)
WBC # BLD: 5.18 K/UL — SIGNIFICANT CHANGE UP (ref 4.8–10.8)
WBC # BLD: 5.18 K/UL — SIGNIFICANT CHANGE UP (ref 4.8–10.8)
WBC # FLD AUTO: 5.18 K/UL — SIGNIFICANT CHANGE UP (ref 4.8–10.8)
WBC # FLD AUTO: 5.18 K/UL — SIGNIFICANT CHANGE UP (ref 4.8–10.8)

## 2024-01-03 PROCEDURE — 99232 SBSQ HOSP IP/OBS MODERATE 35: CPT

## 2024-01-03 RX ORDER — OXYCODONE HYDROCHLORIDE 5 MG/1
1 TABLET ORAL
Qty: 0 | Refills: 0 | DISCHARGE
Start: 2024-01-03

## 2024-01-03 RX ORDER — ACETAMINOPHEN 500 MG
2 TABLET ORAL
Qty: 0 | Refills: 0 | DISCHARGE
Start: 2024-01-03

## 2024-01-03 RX ADMIN — ENOXAPARIN SODIUM 40 MILLIGRAM(S): 100 INJECTION SUBCUTANEOUS at 17:49

## 2024-01-03 RX ADMIN — Medication 650 MILLIGRAM(S): at 21:52

## 2024-01-03 RX ADMIN — Medication 500 MILLIGRAM(S): at 17:55

## 2024-01-03 RX ADMIN — Medication 1 DROP(S): at 21:49

## 2024-01-03 RX ADMIN — LOSARTAN POTASSIUM 100 MILLIGRAM(S): 100 TABLET, FILM COATED ORAL at 05:17

## 2024-01-03 RX ADMIN — Medication 650 MILLIGRAM(S): at 21:49

## 2024-01-03 RX ADMIN — PANTOPRAZOLE SODIUM 40 MILLIGRAM(S): 20 TABLET, DELAYED RELEASE ORAL at 05:17

## 2024-01-03 RX ADMIN — ATORVASTATIN CALCIUM 40 MILLIGRAM(S): 80 TABLET, FILM COATED ORAL at 21:49

## 2024-01-03 NOTE — DISCHARGE NOTE PROVIDER - CARE PROVIDERS DIRECT ADDRESSES
,[email or web address],DirectAddress_Unknown ,[email or web address],[email or web address] ,[email or web address]_Unknown,[email or web address] ,[email or web address],[email or web address],[email or web address]

## 2024-01-03 NOTE — OCCUPATIONAL THERAPY INITIAL EVALUATION ADULT - PERTINENT HX OF CURRENT PROBLEM, REHAB EVAL
88 yrs old F with PMHx of HTN, HLD, presenting from home with right hip pain status post fall.   Pt with R. pubic tubercle fracture s/p Mechanical fall. Follow ortho recs, no intervention.

## 2024-01-03 NOTE — OCCUPATIONAL THERAPY INITIAL EVALUATION ADULT - ADDITIONAL COMMENTS
Pt reports was independent with ADLS and I-ADLS. Pt drives in [place] where she has a car. Pt doesn't have a car in [place]. Pt daughter does the shopping and cleaning. Pt does the cooking. Pt reports was independent with ADLS and I-ADLS. Pt drives in [facility] where she has a car. Pt doesn't have a car in [place]. Pt daughter does the shopping and cleaning. Pt does the cooking.

## 2024-01-03 NOTE — DISCHARGE NOTE PROVIDER - PROVIDER TOKENS
PROVIDER:[TOKEN:[[id number]],FOLLOWUP:[2 weeks]],PROVIDER:[TOKEN:[[id number]],FOLLOWUP:[1 week]] PROVIDER:[TOKEN:[[id number]],FOLLOWUP:[2 weeks]],PROVIDER:[TOKEN:[[id number]],FOLLOWUP:[1 week]] PROVIDER:[TOKEN:[[id number]],FOLLOWUP:[2 weeks],ESTABLISHEDPATIENT:[T]],PROVIDER:[TOKEN:[[id number]],FOLLOWUP:[1 week],ESTABLISHEDPATIENT:[T]],PROVIDER:[TOKEN:[[id number]],FOLLOWUP:[1 month],ESTABLISHEDPATIENT:[T]],PROVIDER:[TOKEN:[[id number]],FOLLOWUP:[1 month],ESTABLISHEDPATIENT:[T]] PROVIDER:[TOKEN:[[id number]],FOLLOWUP:[2 weeks],ESTABLISHEDPATIENT:[T]],PROVIDER:[TOKEN:[[id number]],FOLLOWUP:[1 week],ESTABLISHEDPATIENT:[T]],PROVIDER:[TOKEN:[[id number]],FOLLOWUP:[1 month],ESTABLISHEDPATIENT:[T]],PROVIDER:[TOKEN:[[id number]],FOLLOWUP:[1 month],ESTABLISHEDPATIENT:[T]]

## 2024-01-03 NOTE — OCCUPATIONAL THERAPY INITIAL EVALUATION ADULT - LIVES WITH, PROFILE
Pt reports lives with daughter here in home with 21 steps. Shower is on the second floor. Pt has private area downstairs from main floor. Pt reports has house in [place] where she lives alone for over six months of the year.

## 2024-01-03 NOTE — DISCHARGE NOTE PROVIDER - HOSPITAL COURSE
88 yrs old F with PMHx of HTN, HLD, presenting from home with right hip pain status post fall. Patient had mechanical trip and fall down 1 step while trying to take the garbage out.  She landed on right hip.  No head trauma/injury.  Patient has not been able to bear weight/ambulate since fall. Patient notes small abrasion to right elbow.  Patient denies any neck pain, back pain, anticoagulant use, extremity numbness/tingling, abdominal pain, bowel or bladder incontinence. She endorsed Rt. hip pain and limited movement of RLE. She denies hitting head/LOC/anticoag use. Denies HA, dizziness, NVD, fever, SOB, chest pain, abdominal pain, change in urination and change in bowel habits.    In ED, pt. was hypertensive /94 likely due to pain  Labs were unremarkable  CT abd/pelvis: Right pubic tubercle comminuted fracture without significant displacement extending towards the medial superior ramus. Associated right pelvic blood.    Ortho team consulted in ED.    Pt. is being admitted to medicine for further management of the Rt pubic fracture.  - Trauma workup otherwise -ve  - Ortho on board - no acute surgical intervention - op f/u   - PT/Physiatry consulted - possible inpatient 4a rehab   - Patient had episode of presyncope with PT - no events on tele 24hr - d/c   - continue w/ home meds   - medically stable for inpatient rehab    88 yrs old F with PMHx of HTN, HLD, presenting from home with right hip pain status post fall. Patient had mechanical trip and fall down 1 step while trying to take the garbage out.  She landed on right hip.  No head trauma/injury.  Patient has not been able to bear weight/ambulate since fall. Patient notes small abrasion to right elbow.  Patient denies any neck pain, back pain, anticoagulant use, extremity numbness/tingling, abdominal pain, bowel or bladder incontinence. She endorsed Rt. hip pain and limited movement of RLE. She denies hitting head/LOC/anticoag use. Denies HA, dizziness, NVD, fever, SOB, chest pain, abdominal pain, change in urination and change in bowel habits.    In ED, pt. was hypertensive /94 likely due to pain  Labs were unremarkable  CT abd/pelvis: Right pubic tubercle comminuted fracture without significant displacement extending towards the medial superior ramus. Associated right pelvic blood.    Ortho team consulted in ED.    Pt. was admitted to medicine for further management of the Rt pubic fracture.    The hospital course also included the following:  #Right pubic tubercle fracture s/p Mechanical fall  CT showed Right pubic tubercle comminuted fracture without significant displacement extending towards the medial superior ramus. Associated right pelvic blood.  Rest of trauma work up negative.   Orthopedic evaluation reviewed, no surgical intervention  Fall precaution, seen by PT and physiatry, recommended acute rehab.   Pain control Oxycodone, Tylenol prn.     #Presyncope:   Non-sustained ventricular Tachycardia:   Telemetry showed episodes of NSVT  Seen by cardiology and EP. Patient doesn't want any invasive work up, she refused Loop recorder.   Patient refused echo, start on Toprol 25mg po daily.     #COVID-19 infection:   Patient with fever, mild cough, runny nose, no hypoxia.   Airborne isolation. Check D-dimer.     #HTN  Continue Losartan 100mg and Metoprolol    #HLD: Lipitor    #Recent cataract Sx about 2 weeks ago  Ketorolac eye gtt    The patient is clinically stable, tolerating diet and activity and can be discharged.

## 2024-01-03 NOTE — DISCHARGE NOTE PROVIDER - NPI NUMBER (FOR SYSADMIN USE ONLY) :
[[id number] [[id number] [[id number]],[[id number] [[id number]],[[id number]],[[id number]]

## 2024-01-03 NOTE — DISCHARGE NOTE PROVIDER - NSDCMRMEDTOKEN_GEN_ALL_CORE_FT
acetaminophen 325 mg oral tablet: 2 tab(s) orally every 6 hours As needed Temp greater or equal to 38C (100.4F), Mild Pain (1 - 3)  ketorolac 0.5% ophthalmic solution: 1 drop(s) in each affected eye 4 times a day  losartan 100 mg oral tablet: 1 tab(s) orally once a day  omeprazole 20 mg oral delayed release tablet: 1 tab(s) orally once a day  oxyCODONE 5 mg oral tablet: 1 tab(s) orally every 6 hours As needed Severe Pain (7 - 10)  pravastatin 20 mg oral tablet: 1 tab(s) orally once a day   acetaminophen 325 mg oral tablet: 2 tab(s) orally every 6 hours as needed for Temp greater or equal to 38C (100.4F), Mild Pain (1 - 3)  ketorolac 0.5% ophthalmic solution: 1 drop(s) in each affected eye 4 times a day  losartan 100 mg oral tablet: 1 tab(s) orally once a day  metoprolol succinate 25 mg oral tablet, extended release: 1 tab(s) orally once a day  omeprazole 20 mg oral delayed release tablet: 1 tab(s) orally once a day  oxyCODONE 5 mg oral tablet: 1 tab(s) orally every 6 hours As needed Severe Pain (7 - 10)  pravastatin 20 mg oral tablet: 1 tab(s) orally once a day   acetaminophen 325 mg oral tablet: 2 tab(s) orally every 6 hours as needed for Temp greater or equal to 38C (100.4F), Mild Pain (1 - 3)  aspirin 81 mg oral delayed release tablet: 1 tab(s) orally once a day  ketorolac 0.5% ophthalmic solution: 1 drop(s) in each affected eye 4 times a day  losartan 100 mg oral tablet: 1 tab(s) orally once a day  metoprolol succinate 25 mg oral tablet, extended release: 1 tab(s) orally once a day  omeprazole 20 mg oral delayed release tablet: 1 tab(s) orally once a day  oxyCODONE 5 mg oral tablet: 1 tab(s) orally every 6 hours As needed Severe Pain (7 - 10)  pravastatin 20 mg oral tablet: 1 tab(s) orally once a day

## 2024-01-03 NOTE — OCCUPATIONAL THERAPY INITIAL EVALUATION ADULT - NSOTDISCHREC_GEN_A_CORE
Pt requires assistance with ADLS. OT recommends d/c to rehabilitation facility when medically stable. Refer to IE for details

## 2024-01-03 NOTE — DISCHARGE NOTE PROVIDER - CARE PROVIDER_API CALL
Alfred Dia  Internal Medicine  [address] [place] [address]  Phone: [phone]  Fax: [phone]  Follow Up Time: 2 weeks    Indra García  Orthopaedic Surgery  [address] [place] [address]  Phone: [phone]  Fax: [phone]  Follow Up Time: 1 week   Alfred Dia  Internal Medicine  [address] [place] [address]  Phone: [phone]  Fax: [phone]  Follow Up Time: 2 weeks    Indra García  Orthopaedic Surgery  [address] [place] [address]  Phone: [phone]  Fax: [phone]  Follow Up Time: 1 week   Alfred Dia  Internal Medicine  [address] [place] [address]  Phone: [phone]  Fax: [phone]  Established Patient  Follow Up Time: 2 weeks    Indra García  Orthopaedic Surgery  [address] [place] [address]  Phone: [phone]  Fax: [phone]  Established Patient  Follow Up Time: 1 week    Jasiel Puente  Cardiac Electrophysiology  [address] [place] [address], [address]  [place] [address]  Phone: [phone]  Fax: [phone]  Established Patient  Follow Up Time: 1 month    Alan Xiong  Interventional Cardiology  [address] [facility], [address]  [place] [address]  Phone: [phone]  Fax: [phone]  Established Patient  Follow Up Time: 1 month   Alfred Dia  Internal Medicine  [address] [place] [address]  Phone: [phone]  Fax: [phone]  Established Patient  Follow Up Time: 2 weeks    Indra García  Orthopaedic Surgery  [address] [place] [address]  Phone: [phone]  Fax: [phone]  Established Patient  Follow Up Time: 1 week    Jasiel Puente  Cardiac Electrophysiology  [address] [place] [address], [address]  [place] [address]  Phone: [phone]  Fax: [phone]  Established Patient  Follow Up Time: 1 month    Alan Xiong  Interventional Cardiology  [address] [facility], [address]  [place] [address]  Phone: [phone]  Fax: [phone]  Established Patient  Follow Up Time: 1 month

## 2024-01-03 NOTE — DISCHARGE NOTE PROVIDER - NSDCCPCAREPLAN_GEN_ALL_CORE_FT
PRINCIPAL DISCHARGE DIAGNOSIS  Diagnosis: Pubic tubercle fracture  Assessment and Plan of Treatment: You were admitted after a mechanical fall and had a fracture in one of your hip bones. You were seen by the orthopedic team and they recommended no acute surgical intervention. You will follow up with the orthopedic team in the clinic, in the meantime you will continue to receive physical therapy.     PRINCIPAL DISCHARGE DIAGNOSIS  Diagnosis: Pubic tubercle fracture  Assessment and Plan of Treatment: You presented for the fall injury. CT abd/pelvis showed Right pubic tubercle comminuted fracture without significant displacement extending towards the medial superior ramus. Associated right pelvic blood. Rest of trauma work up was negative. Orthopedic evaluation reviewed, no surgical intervention recommended.  Fall precaution, Physical therapy was continued. Pain control was continued. During the hospital course, telemetry showed episodes of NSVT. Seen by cardiology and EP. You requested to avoid invasive work up including Loop recorder, echocardiogram, Toprol 25mg po daily was continued. For COVID-19 infection, conservative management was continued.  Labs were followed. Vitals were monitored. You are being discharged with the advise to follow up with your PCP and orthopedic team outpatient.  Please seek medical attention if you have chest pain, palpitations, fever, or worsening of your symptoms.        SECONDARY DISCHARGE DIAGNOSES  Diagnosis: 2019 novel coronavirus disease (COVID-19)  Assessment and Plan of Treatment: You have been diagnosed with COVID 19 infection. Most often it causes a respiratory illness that causes fever, coughing, and shortness of breath, but it may involve other organs, including the kidneys, heart, and liver. You may have mild to moderate symptoms or severe illness. Recovery from COVID-19 may take 10 to 14 days or longer depending on your symptoms. Some people have symptoms that go on for months even after they are no longer infected or able to spread the disease to other people.  For latest information on COVID 19 visit: https://www.cdc.gov/coronavirus/2019-ncov/index.html  While in home isolation, you should separate yourself and stay away from other people to help prevent spreading COVID-19. It is safe for you to be around others if ALL of the following are true:  1. It has been at least 5 full days since your symptoms first appeared (day 0 is the day symptoms appeared, and day 1 is the day after symptoms appeared) AND  2. You have gone at least 24 hours with no fever without the use of fever-reducing medicine AND  3. Your other symptoms are improving, including cough, fever, and shortness of breath. (You may end home isolation even if you continue to have symptoms such as loss of taste and smell, which may linger for weeks or months.)  If you still have a fever after 5 days, continue to isolate until you are fever-free without medicine for 24 hours.  It's important to get proper nutrition, stay active as much as you can, and take steps to relieve stress and anxiety as you recover at home.  Call 911 or your local emergency number if you have: Trouble breathing, Chest pain or pressure, Confusion or inability to wake up, Blue lips or face, Confusion, Seizures, Slurred speech, Weakness or numbness in a limb or one side of the face, Swelling of the legs or arms, Any other symptoms that are severe or concern you

## 2024-01-04 PROCEDURE — 99223 1ST HOSP IP/OBS HIGH 75: CPT | Mod: 57

## 2024-01-04 RX ORDER — GUAIFENESIN/DEXTROMETHORPHAN 600MG-30MG
10 TABLET, EXTENDED RELEASE 12 HR ORAL ONCE
Refills: 0 | Status: COMPLETED | OUTPATIENT
Start: 2024-01-04 | End: 2024-01-04

## 2024-01-04 RX ORDER — LORATADINE 10 MG/1
10 TABLET ORAL DAILY
Refills: 0 | Status: DISCONTINUED | OUTPATIENT
Start: 2024-01-04 | End: 2024-01-09

## 2024-01-04 RX ADMIN — Medication 1 DROP(S): at 18:17

## 2024-01-04 RX ADMIN — ATORVASTATIN CALCIUM 40 MILLIGRAM(S): 80 TABLET, FILM COATED ORAL at 21:10

## 2024-01-04 RX ADMIN — Medication 1 DROP(S): at 13:21

## 2024-01-04 RX ADMIN — ENOXAPARIN SODIUM 40 MILLIGRAM(S): 100 INJECTION SUBCUTANEOUS at 18:17

## 2024-01-04 RX ADMIN — LORATADINE 10 MILLIGRAM(S): 10 TABLET ORAL at 21:10

## 2024-01-04 RX ADMIN — PANTOPRAZOLE SODIUM 40 MILLIGRAM(S): 20 TABLET, DELAYED RELEASE ORAL at 06:24

## 2024-01-04 RX ADMIN — LOSARTAN POTASSIUM 100 MILLIGRAM(S): 100 TABLET, FILM COATED ORAL at 06:24

## 2024-01-04 RX ADMIN — Medication 500 MILLIGRAM(S): at 13:20

## 2024-01-04 RX ADMIN — Medication 10 MILLILITER(S): at 21:10

## 2024-01-04 NOTE — CONSULT NOTE ADULT - SUBJECTIVE AND OBJECTIVE BOX
Patient is a 89y old  Female who presents with a chief complaint of Mechanical Fall  (03 Jan 2024 13:38)    HPI:  88 yrs old F with PMHx of HTN, HLD, presenting from home with right hip pain status post fall. Patient had mechanical trip and fall down 1 step while trying to take the garbage out.  She landed on right hip.  No head trauma/injury.  Patient has not been able to bear weight/ambulate since fall. Patient notes small abrasion to right elbow.  Patient denies any neck pain, back pain, anticoagulant use, extremity numbness/tingling, abdominal pain, bowel or bladder incontinence. She endorsed Rt. hip pain and limited movement of RLE. She denies hitting head/LOC/anticoag use. Denies HA, dizziness, NVD, fever, SOB, chest pain, abdominal pain, change in urination and change in bowel habits.    In ED, pt. was hypertensive /94 likely due to pain  Labs were unremarkable  CT abd/pelvis: Right pubic tubercle comminuted fracture without significant displacement extending towards the medial superior ramus. Associated right pelvic blood.    Ortho team consulted in ED.    Pt. is being admitted to medicine for further management of the Rt pubic fracture.     (01 Jan 2024 02:28)      EP consulted for evaluation of NSVT on tele. Pt denies hx of syncope, presyncope, chest pain, dizziness.     REVIEW OF SYSTEMS    [x] A ten-point review of systems was otherwise negative except as noted.  [ ] Due to altered mental status/intubation, subjective information were not able to be obtained from the patient. History was obtained, to the extent possible, from review of the chart and collateral sources of information.      PAST MEDICAL & SURGICAL HISTORY:  Hypertension      GERD (gastroesophageal reflux disease)      History of surgery  CHOLECYSTECTOMY, HYSTERECTOMY, RIGHT FOOT SURGERY, APPENDECTOMY          Home Medications:  acetaminophen 325 mg oral tablet: 2 tab(s) orally every 6 hours As needed Temp greater or equal to 38C (100.4F), Mild Pain (1 - 3) (03 Jan 2024 09:44)  ketorolac 0.5% ophthalmic solution: 1 drop(s) in each affected eye 4 times a day (01 Jan 2024 03:13)  losartan 100 mg oral tablet: 1 tab(s) orally once a day (01 Jan 2024 03:13)  omeprazole 20 mg oral delayed release tablet: 1 tab(s) orally once a day (01 Jan 2024 03:13)  oxyCODONE 5 mg oral tablet: 1 tab(s) orally every 6 hours As needed Severe Pain (7 - 10) (03 Jan 2024 09:44)  pravastatin 20 mg oral tablet: 1 tab(s) orally once a day (01 Jan 2024 03:13)      Allergies:    No Known Drug Allergies  Chlorine wipes  rash, itching (Rash)      FAMILY HISTORY:  No pertinent family history in first degree relatives        SOCIAL HISTORY:  CIGARETTES: denies  ALCOHOL: denies     MEDICATIONS  (STANDING):  atorvastatin 40 milliGRAM(s) Oral at bedtime  enoxaparin Injectable 40 milliGRAM(s) SubCutaneous every 24 hours  ketorolac 0.5% Ophthalmic Solution 1 Drop(s) Both EYES four times a day  losartan 100 milliGRAM(s) Oral daily  magnesium gluconate 500 milliGRAM(s) Oral daily  pantoprazole    Tablet 40 milliGRAM(s) Oral before breakfast    MEDICATIONS  (PRN):  acetaminophen     Tablet .. 650 milliGRAM(s) Oral every 6 hours PRN Temp greater or equal to 38C (100.4F), Mild Pain (1 - 3)  aluminum hydroxide/magnesium hydroxide/simethicone Suspension 30 milliLiter(s) Oral every 6 hours PRN Dyspepsia  melatonin 3 milliGRAM(s) Oral at bedtime PRN Insomnia  oxyCODONE    IR 5 milliGRAM(s) Oral every 6 hours PRN Severe Pain (7 - 10)      Vital Signs Last 24 Hrs  T(C): 37 (04 Jan 2024 14:19), Max: 38.6 (03 Jan 2024 20:14)  T(F): 98.6 (04 Jan 2024 14:19), Max: 101.5 (03 Jan 2024 20:14)  HR: 97 (04 Jan 2024 14:19) (85 - 102)  BP: 141/67 (04 Jan 2024 14:19) (141/67 - 148/79)  BP(mean): --  RR: 16 (04 Jan 2024 14:19) (16 - 18)  SpO2: --        PHYSICAL EXAM:    GENERAL: In no apparent distress, well nourished, and hydrated.  HEART: Regular rate and rhythm; No murmurs, rubs, or gallops.  PULMONARY: Clear to auscultation and perfusion.  No rales, wheezing, or rhonchi bilaterally.  ABDOMEN: Soft, Nontender, Nondistended; Bowel sounds present  EXTREMITIES:  2+ Peripheral Pulses, No clubbing, cyanosis, or edema  NEUROLOGICAL: AO x4, KLEIN, speech clear    INTERPRETATION OF TELEMETRY:  bpm    ECG:  < from: 12 Lead ECG (01.02.24 @ 11:35) >    Ventricular Rate 95 BPM    Atrial Rate 95 BPM    P-R Interval 130 ms    QRS Duration 82 ms    Q-T Interval 380 ms    QTC Calculation(Bazett) 477 ms    P Axis 52 degrees    R Axis 12 degrees    T Axis 36 degrees    Diagnosis Line Normal sinus rhythm  Normal ECG    < end of copied text >      I&O's Detail    03 Jan 2024 07:01  -  04 Jan 2024 07:00  --------------------------------------------------------  IN:    Oral Fluid: 780 mL  Total IN: 780 mL    OUT:    Voided (mL): 1425 mL  Total OUT: 1425 mL    Total NET: -645 mL      04 Jan 2024 07:01  -  04 Jan 2024 18:40  --------------------------------------------------------  IN:    Oral Fluid: 472 mL  Total IN: 472 mL    OUT:    Voided (mL): 350 mL  Total OUT: 350 mL    Total NET: 122 mL          LABS:                        12.5   5.18  )-----------( 150      ( 03 Jan 2024 06:14 )             37.8     01-03    139  |  103  |  19  ----------------------------<  107<H>  4.9   |  26  |  0.8    Ca    9.1      03 Jan 2024 06:14  Mg     2.1     01-03    TPro  6.9  /  Alb  3.8  /  TBili  0.9  /  DBili  x   /  AST  20  /  ALT  18  /  AlkPhos  73  01-03          BNP      I&O's Detail    03 Jan 2024 07:01  -  04 Jan 2024 07:00  --------------------------------------------------------  IN:    Oral Fluid: 780 mL  Total IN: 780 mL    OUT:    Voided (mL): 1425 mL  Total OUT: 1425 mL    Total NET: -645 mL      04 Jan 2024 07:01  -  04 Jan 2024 18:40  --------------------------------------------------------  IN:    Oral Fluid: 472 mL  Total IN: 472 mL    OUT:    Voided (mL): 350 mL  Total OUT: 350 mL    Total NET: 122 mL      RADIOLOGY:

## 2024-01-04 NOTE — CONSULT NOTE ADULT - ASSESSMENT
Cardiology: None  PCP: Ifeoma    88 yrs old F with PMHx of HTN, HLD, presenting from home with right hip pain status post mechanical fall resulting in Rt. pubic tubercle fracture. NSVT noted on tele.    Orthostatics : negative    Impression:  NSVT on tele  S/p mechanical fall  Rt pubic fx  Hx HTN, HLD    Plan:  - Recommend cardiology c/s for nuclear stress test r/o underlying ischemia  - TTE  - If sanderson negative, will consider loop implant (pt agrees)  - Please recall EP once sanderson is complete, [id number]    Plan discussed with daughter Marylou (number in chart) Cardiology: None  PCP: Ifeoma    88 yrs old F with PMHx of HTN, HLD, presenting from home with right hip pain status post mechanical fall resulting in Rt. pubic tubercle fracture. NSVT noted on tele.    Orthostatics : negative    Impression:  NSVT on tele  S/p mechanical fall  Rt pubic fx  Hx HTN, HLD    Plan:  - Recommend cardiology c/s for nuclear stress test r/o underlying ischemia  - TTE  - If sanderson negative, will consider loop implant (pt agrees)  - Please recall EP once sanderson is complete, [id number]    Plan discussed with daughter Marylou (number in chart)

## 2024-01-05 LAB
ANION GAP SERPL CALC-SCNC: 12 MMOL/L — SIGNIFICANT CHANGE UP (ref 7–14)
ANION GAP SERPL CALC-SCNC: 12 MMOL/L — SIGNIFICANT CHANGE UP (ref 7–14)
BASOPHILS # BLD AUTO: 0.03 K/UL — SIGNIFICANT CHANGE UP (ref 0–0.2)
BASOPHILS # BLD AUTO: 0.03 K/UL — SIGNIFICANT CHANGE UP (ref 0–0.2)
BASOPHILS NFR BLD AUTO: 0.6 % — SIGNIFICANT CHANGE UP (ref 0–1)
BASOPHILS NFR BLD AUTO: 0.6 % — SIGNIFICANT CHANGE UP (ref 0–1)
BUN SERPL-MCNC: 18 MG/DL — SIGNIFICANT CHANGE UP (ref 10–20)
BUN SERPL-MCNC: 18 MG/DL — SIGNIFICANT CHANGE UP (ref 10–20)
CALCIUM SERPL-MCNC: 8.6 MG/DL — SIGNIFICANT CHANGE UP (ref 8.4–10.5)
CALCIUM SERPL-MCNC: 8.6 MG/DL — SIGNIFICANT CHANGE UP (ref 8.4–10.5)
CHLORIDE SERPL-SCNC: 103 MMOL/L — SIGNIFICANT CHANGE UP (ref 98–110)
CHLORIDE SERPL-SCNC: 103 MMOL/L — SIGNIFICANT CHANGE UP (ref 98–110)
CO2 SERPL-SCNC: 21 MMOL/L — SIGNIFICANT CHANGE UP (ref 17–32)
CO2 SERPL-SCNC: 21 MMOL/L — SIGNIFICANT CHANGE UP (ref 17–32)
CREAT SERPL-MCNC: 0.8 MG/DL — SIGNIFICANT CHANGE UP (ref 0.7–1.5)
CREAT SERPL-MCNC: 0.8 MG/DL — SIGNIFICANT CHANGE UP (ref 0.7–1.5)
EGFR: 71 ML/MIN/1.73M2 — SIGNIFICANT CHANGE UP
EGFR: 71 ML/MIN/1.73M2 — SIGNIFICANT CHANGE UP
EOSINOPHIL # BLD AUTO: 0.03 K/UL — SIGNIFICANT CHANGE UP (ref 0–0.7)
EOSINOPHIL # BLD AUTO: 0.03 K/UL — SIGNIFICANT CHANGE UP (ref 0–0.7)
EOSINOPHIL NFR BLD AUTO: 0.6 % — SIGNIFICANT CHANGE UP (ref 0–8)
EOSINOPHIL NFR BLD AUTO: 0.6 % — SIGNIFICANT CHANGE UP (ref 0–8)
GLUCOSE SERPL-MCNC: 110 MG/DL — HIGH (ref 70–99)
GLUCOSE SERPL-MCNC: 110 MG/DL — HIGH (ref 70–99)
HCT VFR BLD CALC: 39.4 % — SIGNIFICANT CHANGE UP (ref 37–47)
HCT VFR BLD CALC: 39.4 % — SIGNIFICANT CHANGE UP (ref 37–47)
HGB BLD-MCNC: 12.5 G/DL — SIGNIFICANT CHANGE UP (ref 12–16)
HGB BLD-MCNC: 12.5 G/DL — SIGNIFICANT CHANGE UP (ref 12–16)
IMM GRANULOCYTES NFR BLD AUTO: 0.2 % — SIGNIFICANT CHANGE UP (ref 0.1–0.3)
IMM GRANULOCYTES NFR BLD AUTO: 0.2 % — SIGNIFICANT CHANGE UP (ref 0.1–0.3)
LYMPHOCYTES # BLD AUTO: 1.59 K/UL — SIGNIFICANT CHANGE UP (ref 1.2–3.4)
LYMPHOCYTES # BLD AUTO: 1.59 K/UL — SIGNIFICANT CHANGE UP (ref 1.2–3.4)
LYMPHOCYTES # BLD AUTO: 30.8 % — SIGNIFICANT CHANGE UP (ref 20.5–51.1)
LYMPHOCYTES # BLD AUTO: 30.8 % — SIGNIFICANT CHANGE UP (ref 20.5–51.1)
MAGNESIUM SERPL-MCNC: 2 MG/DL — SIGNIFICANT CHANGE UP (ref 1.8–2.4)
MAGNESIUM SERPL-MCNC: 2 MG/DL — SIGNIFICANT CHANGE UP (ref 1.8–2.4)
MCHC RBC-ENTMCNC: 30.1 PG — SIGNIFICANT CHANGE UP (ref 27–31)
MCHC RBC-ENTMCNC: 30.1 PG — SIGNIFICANT CHANGE UP (ref 27–31)
MCHC RBC-ENTMCNC: 31.7 G/DL — LOW (ref 32–37)
MCHC RBC-ENTMCNC: 31.7 G/DL — LOW (ref 32–37)
MCV RBC AUTO: 94.9 FL — SIGNIFICANT CHANGE UP (ref 81–99)
MCV RBC AUTO: 94.9 FL — SIGNIFICANT CHANGE UP (ref 81–99)
MONOCYTES # BLD AUTO: 0.68 K/UL — HIGH (ref 0.1–0.6)
MONOCYTES # BLD AUTO: 0.68 K/UL — HIGH (ref 0.1–0.6)
MONOCYTES NFR BLD AUTO: 13.2 % — HIGH (ref 1.7–9.3)
MONOCYTES NFR BLD AUTO: 13.2 % — HIGH (ref 1.7–9.3)
NEUTROPHILS # BLD AUTO: 2.83 K/UL — SIGNIFICANT CHANGE UP (ref 1.4–6.5)
NEUTROPHILS # BLD AUTO: 2.83 K/UL — SIGNIFICANT CHANGE UP (ref 1.4–6.5)
NEUTROPHILS NFR BLD AUTO: 54.6 % — SIGNIFICANT CHANGE UP (ref 42.2–75.2)
NEUTROPHILS NFR BLD AUTO: 54.6 % — SIGNIFICANT CHANGE UP (ref 42.2–75.2)
NRBC # BLD: 0 /100 WBCS — SIGNIFICANT CHANGE UP (ref 0–0)
NRBC # BLD: 0 /100 WBCS — SIGNIFICANT CHANGE UP (ref 0–0)
PLATELET # BLD AUTO: 137 K/UL — SIGNIFICANT CHANGE UP (ref 130–400)
PLATELET # BLD AUTO: 137 K/UL — SIGNIFICANT CHANGE UP (ref 130–400)
PMV BLD: 12.2 FL — HIGH (ref 7.4–10.4)
PMV BLD: 12.2 FL — HIGH (ref 7.4–10.4)
POTASSIUM SERPL-MCNC: 5.5 MMOL/L — HIGH (ref 3.5–5)
POTASSIUM SERPL-MCNC: 5.5 MMOL/L — HIGH (ref 3.5–5)
POTASSIUM SERPL-SCNC: 5.5 MMOL/L — HIGH (ref 3.5–5)
POTASSIUM SERPL-SCNC: 5.5 MMOL/L — HIGH (ref 3.5–5)
RBC # BLD: 4.15 M/UL — LOW (ref 4.2–5.4)
RBC # BLD: 4.15 M/UL — LOW (ref 4.2–5.4)
RBC # FLD: 12.9 % — SIGNIFICANT CHANGE UP (ref 11.5–14.5)
RBC # FLD: 12.9 % — SIGNIFICANT CHANGE UP (ref 11.5–14.5)
SODIUM SERPL-SCNC: 136 MMOL/L — SIGNIFICANT CHANGE UP (ref 135–146)
SODIUM SERPL-SCNC: 136 MMOL/L — SIGNIFICANT CHANGE UP (ref 135–146)
WBC # BLD: 5.17 K/UL — SIGNIFICANT CHANGE UP (ref 4.8–10.8)
WBC # BLD: 5.17 K/UL — SIGNIFICANT CHANGE UP (ref 4.8–10.8)
WBC # FLD AUTO: 5.17 K/UL — SIGNIFICANT CHANGE UP (ref 4.8–10.8)
WBC # FLD AUTO: 5.17 K/UL — SIGNIFICANT CHANGE UP (ref 4.8–10.8)

## 2024-01-05 PROCEDURE — 99232 SBSQ HOSP IP/OBS MODERATE 35: CPT

## 2024-01-05 PROCEDURE — 99222 1ST HOSP IP/OBS MODERATE 55: CPT

## 2024-01-05 RX ORDER — SODIUM ZIRCONIUM CYCLOSILICATE 10 G/10G
10 POWDER, FOR SUSPENSION ORAL ONCE
Refills: 0 | Status: COMPLETED | OUTPATIENT
Start: 2024-01-05 | End: 2024-01-05

## 2024-01-05 RX ORDER — GUAIFENESIN/DEXTROMETHORPHAN 600MG-30MG
10 TABLET, EXTENDED RELEASE 12 HR ORAL EVERY 6 HOURS
Refills: 0 | Status: DISCONTINUED | OUTPATIENT
Start: 2024-01-05 | End: 2024-01-09

## 2024-01-05 RX ORDER — METOPROLOL TARTRATE 50 MG
25 TABLET ORAL DAILY
Refills: 0 | Status: DISCONTINUED | OUTPATIENT
Start: 2024-01-05 | End: 2024-01-09

## 2024-01-05 RX ORDER — CEPHALEXIN 500 MG
500 CAPSULE ORAL ONCE
Refills: 0 | Status: DISCONTINUED | OUTPATIENT
Start: 2024-01-05 | End: 2024-01-05

## 2024-01-05 RX ORDER — SODIUM CHLORIDE 0.65 %
1 AEROSOL, SPRAY (ML) NASAL THREE TIMES A DAY
Refills: 0 | Status: DISCONTINUED | OUTPATIENT
Start: 2024-01-05 | End: 2024-01-09

## 2024-01-05 RX ADMIN — Medication 10 MILLILITER(S): at 12:45

## 2024-01-05 RX ADMIN — Medication 1 DROP(S): at 00:41

## 2024-01-05 RX ADMIN — ENOXAPARIN SODIUM 40 MILLIGRAM(S): 100 INJECTION SUBCUTANEOUS at 18:17

## 2024-01-05 RX ADMIN — Medication 1 DROP(S): at 12:43

## 2024-01-05 RX ADMIN — Medication 1 DROP(S): at 18:17

## 2024-01-05 RX ADMIN — Medication 10 MILLILITER(S): at 18:17

## 2024-01-05 RX ADMIN — SODIUM ZIRCONIUM CYCLOSILICATE 10 GRAM(S): 10 POWDER, FOR SUSPENSION ORAL at 21:17

## 2024-01-05 RX ADMIN — Medication 500 MILLIGRAM(S): at 12:43

## 2024-01-05 RX ADMIN — ATORVASTATIN CALCIUM 40 MILLIGRAM(S): 80 TABLET, FILM COATED ORAL at 21:17

## 2024-01-05 RX ADMIN — LOSARTAN POTASSIUM 100 MILLIGRAM(S): 100 TABLET, FILM COATED ORAL at 05:32

## 2024-01-05 RX ADMIN — PANTOPRAZOLE SODIUM 40 MILLIGRAM(S): 20 TABLET, DELAYED RELEASE ORAL at 05:31

## 2024-01-05 RX ADMIN — Medication 10 MILLILITER(S): at 05:32

## 2024-01-05 NOTE — CHART NOTE - NSCHARTNOTEFT_GEN_A_CORE
Risk and benefits of internal loop recorder implantation were discussed and described at length with the patient. All questions were answered. The patient refused internal loop recorder implant at this time.

## 2024-01-05 NOTE — CONSULT NOTE ADULT - SUBJECTIVE AND OBJECTIVE BOX
HPI:  88 yrs old F with PMHx of HTN, HLD, presenting from home with right hip pain status post fall. Patient had mechanical trip and fall down 1 step while trying to take the garbage out.  She landed on right hip.  No head trauma/injury.  Patient has not been able to bear weight/ambulate since fall. Patient notes small abrasion to right elbow.  Patient denies any neck pain, back pain, anticoagulant use, extremity numbness/tingling, abdominal pain, bowel or bladder incontinence. She endorsed Rt. hip pain and limited movement of RLE. She denies hitting head/LOC/anticoag use. Denies HA, dizziness, NVD, fever, SOB, chest pain, abdominal pain, change in urination and change in bowel habits.    In ED, pt. was hypertensive /94 likely due to pain  Labs were unremarkable  CT abd/pelvis: Right pubic tubercle comminuted fracture without significant displacement extending towards the medial superior ramus. Associated right pelvic blood.  Ortho team consulted in ED.  Pt. is being admitted to medicine for further management of the Rt pubic fracture.    Cardiology HPI:  Pt admitted for R pubic fx, no surgical intervention. Has been undergoing PT. Had several short episodes of NSVT on telemetry, seen by EP and likely needs ischemic work up.  Pt reports prior to her most recent fall she has been active at home, doing all her house chores without any issues, never has chest pain, dyspnea, lightheadedness, palpitations. Currently only has difficulties walking and significant allergic reaction to chlorine used to clean the bathroom in her room which is highly bothersome to her. Does not want to undergo any cardiac testing here and wants to continue with PT, either in hospital or at home. Wants to follow up with her doctors at [facility].    States that she had a cholecystectomy 1 year ago and as part of her pre op testing she had an echo which she was told was "good".  PAST MEDICAL & SURGICAL HISTORY  Hypertension    GERD (gastroesophageal reflux disease)    History of surgery  CHOLECYSTECTOMY, HYSTERECTOMY, RIGHT FOOT SURGERY, APPENDECTOMY    FAMILY HISTORY:  FAMILY HISTORY:  No pertinent family history in first degree relatives      ALLERGIES:  No Known Drug Allergies  Chlorine wipes  rash, itching (Rash)      MEDICATIONS:  atorvastatin 40 milliGRAM(s) Oral at bedtime  enoxaparin Injectable 40 milliGRAM(s) SubCutaneous every 24 hours  guaifenesin/dextromethorphan Oral Liquid 10 milliLiter(s) Oral every 6 hours  ketorolac 0.5% Ophthalmic Solution 1 Drop(s) Both EYES four times a day  loratadine 10 milliGRAM(s) Oral daily  losartan 100 milliGRAM(s) Oral daily  magnesium gluconate 500 milliGRAM(s) Oral daily  pantoprazole    Tablet 40 milliGRAM(s) Oral before breakfast    PRN:  acetaminophen     Tablet .. 650 milliGRAM(s) Oral every 6 hours PRN  aluminum hydroxide/magnesium hydroxide/simethicone Suspension 30 milliLiter(s) Oral every 6 hours PRN  melatonin 3 milliGRAM(s) Oral at bedtime PRN  oxyCODONE    IR 5 milliGRAM(s) Oral every 6 hours PRN      HOME MEDICATIONS:  Home Medications:  acetaminophen 325 mg oral tablet: 2 tab(s) orally every 6 hours As needed Temp greater or equal to 38C (100.4F), Mild Pain (1 - 3) (03 Jan 2024 09:44)  ketorolac 0.5% ophthalmic solution: 1 drop(s) in each affected eye 4 times a day (01 Jan 2024 03:13)  losartan 100 mg oral tablet: 1 tab(s) orally once a day (01 Jan 2024 03:13)  omeprazole 20 mg oral delayed release tablet: 1 tab(s) orally once a day (01 Jan 2024 03:13)  oxyCODONE 5 mg oral tablet: 1 tab(s) orally every 6 hours As needed Severe Pain (7 - 10) (03 Jan 2024 09:44)  pravastatin 20 mg oral tablet: 1 tab(s) orally once a day (01 Jan 2024 03:13)      VITALS:   T(F): 99.9 (01-05 @ 05:00), Max: 101.5 (01-03 @ 20:14)  HR: 89 (01-05 @ 05:00) (84 - 113)  BP: 170/81 (01-05 @ 05:00) (140/63 - 179/84)  BP(mean): 102 (01-02 @ 15:29) (102 - 120)  RR: 18 (01-05 @ 05:00) (16 - 18)  SpO2: 94% (01-02 @ 22:39) (94% - 98%)    I&O's Summary    04 Jan 2024 07:01  -  05 Jan 2024 07:00  --------------------------------------------------------  IN: 472 mL / OUT: 350 mL / NET: 122 mL        REVIEW OF SYSTEMS:  CONSTITUTIONAL: No weakness, fevers or chills  RESPIRATORY: No cough, sob  CARDIOVASCULAR: See HPI  GASTROINTESTINAL: No abdominal pain. No nausea, vomiting, diarrhea   GENITOURINARY: No dysuria, frequency or hematuria    PHYSICAL EXAM:  General: Not in distress.  Non-toxic appearing.   Cardio: regular, S1, S2, systolic murmur  Pulm: Clear air entry bilaterally, no wheezing, rales, rhonchi  Abdomen: Soft, non-tender, non-distended, BS+  Extremities: No edema in bilateral LE  Neuro: A&O x3. No focal deficits      01-01 Chol 190 LDL -- HDL 87 Trig 54    ECG:  NSR    TELEMETRY EVENTS:  1-2 second runs of NSVT 1/2-1/4     HPI:  88 yrs old F with PMHx of HTN, HLD, presenting from home with right hip pain status post fall. Patient had mechanical trip and fall down 1 step while trying to take the garbage out.  She landed on right hip.  No head trauma/injury.  Patient has not been able to bear weight/ambulate since fall. Patient notes small abrasion to right elbow.  Patient denies any neck pain, back pain, anticoagulant use, extremity numbness/tingling, abdominal pain, bowel or bladder incontinence. She endorsed Rt. hip pain and limited movement of RLE. She denies hitting head/LOC/anticoag use. Denies HA, dizziness, NVD, fever, SOB, chest pain, abdominal pain, change in urination and change in bowel habits.    In ED, pt. was hypertensive /94 likely due to pain  Labs were unremarkable  CT abd/pelvis: Right pubic tubercle comminuted fracture without significant displacement extending towards the medial superior ramus. Associated right pelvic blood.  Ortho team consulted in ED.  Pt. is being admitted to medicine for further management of the Rt pubic fracture.    Cardiology HPI:  Pt admitted for R pubic fx, no surgical intervention. Has been undergoing PT. Had several short episodes of NSVT on telemetry, seen by EP and likely needs ischemic work up.  Pt reports prior to her most recent fall she has been active at home, doing all her house chores without any issues, never has chest pain, dyspnea, lightheadedness, palpitations. Currently only has difficulties walking and significant allergic reaction to chlorine used to clean the bathroom in her room which is highly bothersome to her. Does not want to undergo any cardiac testing here and wants to continue with PT, either in hospital or at home. Wants to follow up with her doctors at [facility].    States that she had a cholecystectomy 1 year ago and as part of her pre op testing she had an echo which she was told was "good".  PAST MEDICAL & SURGICAL HISTORY  Hypertension    GERD (gastroesophageal reflux disease)    History of surgery  CHOLECYSTECTOMY, HYSTERECTOMY, RIGHT FOOT SURGERY, APPENDECTOMY    FAMILY HISTORY:  FAMILY HISTORY:  No pertinent family history in first degree relatives      ALLERGIES:  No Known Drug Allergies  Chlorine wipes  rash, itching (Rash)      MEDICATIONS:  atorvastatin 40 milliGRAM(s) Oral at bedtime  enoxaparin Injectable 40 milliGRAM(s) SubCutaneous every 24 hours  guaifenesin/dextromethorphan Oral Liquid 10 milliLiter(s) Oral every 6 hours  ketorolac 0.5% Ophthalmic Solution 1 Drop(s) Both EYES four times a day  loratadine 10 milliGRAM(s) Oral daily  losartan 100 milliGRAM(s) Oral daily  magnesium gluconate 500 milliGRAM(s) Oral daily  pantoprazole    Tablet 40 milliGRAM(s) Oral before breakfast    PRN:  acetaminophen     Tablet .. 650 milliGRAM(s) Oral every 6 hours PRN  aluminum hydroxide/magnesium hydroxide/simethicone Suspension 30 milliLiter(s) Oral every 6 hours PRN  melatonin 3 milliGRAM(s) Oral at bedtime PRN  oxyCODONE    IR 5 milliGRAM(s) Oral every 6 hours PRN      HOME MEDICATIONS:  Home Medications:  acetaminophen 325 mg oral tablet: 2 tab(s) orally every 6 hours As needed Temp greater or equal to 38C (100.4F), Mild Pain (1 - 3) (03 Jan 2024 09:44)  ketorolac 0.5% ophthalmic solution: 1 drop(s) in each affected eye 4 times a day (01 Jan 2024 03:13)  losartan 100 mg oral tablet: 1 tab(s) orally once a day (01 Jan 2024 03:13)  omeprazole 20 mg oral delayed release tablet: 1 tab(s) orally once a day (01 Jan 2024 03:13)  oxyCODONE 5 mg oral tablet: 1 tab(s) orally every 6 hours As needed Severe Pain (7 - 10) (03 Jan 2024 09:44)  pravastatin 20 mg oral tablet: 1 tab(s) orally once a day (01 Jan 2024 03:13)      VITALS:   T(F): 99.9 (01-05 @ 05:00), Max: 101.5 (01-03 @ 20:14)  HR: 89 (01-05 @ 05:00) (84 - 113)  BP: 170/81 (01-05 @ 05:00) (140/63 - 179/84)  BP(mean): 102 (01-02 @ 15:29) (102 - 120)  RR: 18 (01-05 @ 05:00) (16 - 18)  SpO2: 94% (01-02 @ 22:39) (94% - 98%)    I&O's Summary    04 Jan 2024 07:01  -  05 Jan 2024 07:00  --------------------------------------------------------  IN: 472 mL / OUT: 350 mL / NET: 122 mL        REVIEW OF SYSTEMS:  CONSTITUTIONAL: No weakness, fevers or chills  RESPIRATORY: No cough, sob  CARDIOVASCULAR: See HPI  GASTROINTESTINAL: No abdominal pain. No nausea, vomiting, diarrhea   GENITOURINARY: No dysuria, frequency or hematuria    PHYSICAL EXAM:  General: Not in distress.  Non-toxic appearing.   Cardio: regular, S1, S2, systolic murmur  Pulm: Clear air entry bilaterally, no wheezing, rales, rhonchi  Abdomen: Soft, non-tender, non-distended, BS+  Extremities: No edema in bilateral LE  Neuro: A&O x3. No focal deficits      01-01 Chol 190 LDL -- HDL 87 Trig 54    ECG:  NSR    TELEMETRY EVENTS:  1-2 second runs of NSVT 1/2-1/4     HPI:    88 yrs old F with PMHx of HTN, HLD, presenting from home with right hip pain status post fall. Patient had mechanical trip and fall down 1 step while trying to take the garbage out.  She landed on right hip.  No head trauma/injury.  Patient has not been able to bear weight/ambulate since fall. Patient notes small abrasion to right elbow.  Patient denies any neck pain, back pain, anticoagulant use, extremity numbness/tingling, abdominal pain, bowel or bladder incontinence. She endorsed Rt. hip pain and limited movement of RLE. She denies hitting head/LOC/anticoag use. Denies HA, dizziness, NVD, fever, SOB, chest pain, abdominal pain, change in urination and change in bowel habits.    In ED, pt. was hypertensive /94 likely due to pain  Labs were unremarkable  CT abd/pelvis: Right pubic tubercle comminuted fracture without significant displacement extending towards the medial superior ramus. Associated right pelvic blood.  Ortho team consulted in ED.  Pt. is being admitted to medicine for further management of the Rt pubic fracture.    Cardiology HPI:  Pt admitted for R pubic fx, no surgical intervention. Has been undergoing PT. Had several short episodes of NSVT on telemetry, seen by EP and likely needs ischemic work up.  Pt reports prior to her most recent fall she has been active at home, doing all her house chores without any issues, never has chest pain, dyspnea, lightheadedness, palpitations. Currently only has difficulties walking and significant allergic reaction to chlorine used to clean the bathroom in her room which is highly bothersome to her. Does not want to undergo any cardiac testing here and wants to continue with PT, either in hospital or at home. Wants to follow up with her doctors at [facility].    States that she had a cholecystectomy 1 year ago and as part of her pre op testing she had an echo which she was told was "good".  PAST MEDICAL & SURGICAL HISTORY  Hypertension    GERD (gastroesophageal reflux disease)    History of surgery  CHOLECYSTECTOMY, HYSTERECTOMY, RIGHT FOOT SURGERY, APPENDECTOMY    FAMILY HISTORY:  FAMILY HISTORY:  No pertinent family history in first degree relatives      ALLERGIES:  No Known Drug Allergies  Chlorine wipes  rash, itching (Rash)      MEDICATIONS:  atorvastatin 40 milliGRAM(s) Oral at bedtime  enoxaparin Injectable 40 milliGRAM(s) SubCutaneous every 24 hours  guaifenesin/dextromethorphan Oral Liquid 10 milliLiter(s) Oral every 6 hours  ketorolac 0.5% Ophthalmic Solution 1 Drop(s) Both EYES four times a day  loratadine 10 milliGRAM(s) Oral daily  losartan 100 milliGRAM(s) Oral daily  magnesium gluconate 500 milliGRAM(s) Oral daily  pantoprazole    Tablet 40 milliGRAM(s) Oral before breakfast    PRN:  acetaminophen     Tablet .. 650 milliGRAM(s) Oral every 6 hours PRN  aluminum hydroxide/magnesium hydroxide/simethicone Suspension 30 milliLiter(s) Oral every 6 hours PRN  melatonin 3 milliGRAM(s) Oral at bedtime PRN  oxyCODONE    IR 5 milliGRAM(s) Oral every 6 hours PRN      HOME MEDICATIONS:  Home Medications:  acetaminophen 325 mg oral tablet: 2 tab(s) orally every 6 hours As needed Temp greater or equal to 38C (100.4F), Mild Pain (1 - 3) (03 Jan 2024 09:44)  ketorolac 0.5% ophthalmic solution: 1 drop(s) in each affected eye 4 times a day (01 Jan 2024 03:13)  losartan 100 mg oral tablet: 1 tab(s) orally once a day (01 Jan 2024 03:13)  omeprazole 20 mg oral delayed release tablet: 1 tab(s) orally once a day (01 Jan 2024 03:13)  oxyCODONE 5 mg oral tablet: 1 tab(s) orally every 6 hours As needed Severe Pain (7 - 10) (03 Jan 2024 09:44)  pravastatin 20 mg oral tablet: 1 tab(s) orally once a day (01 Jan 2024 03:13)      VITALS:   T(F): 99.9 (01-05 @ 05:00), Max: 101.5 (01-03 @ 20:14)  HR: 89 (01-05 @ 05:00) (84 - 113)  BP: 170/81 (01-05 @ 05:00) (140/63 - 179/84)  BP(mean): 102 (01-02 @ 15:29) (102 - 120)  RR: 18 (01-05 @ 05:00) (16 - 18)  SpO2: 94% (01-02 @ 22:39) (94% - 98%)    I&O's Summary    04 Jan 2024 07:01  -  05 Jan 2024 07:00  --------------------------------------------------------  IN: 472 mL / OUT: 350 mL / NET: 122 mL        REVIEW OF SYSTEMS:  CONSTITUTIONAL: No weakness, fevers or chills  RESPIRATORY: No cough, sob  CARDIOVASCULAR: See HPI  GASTROINTESTINAL: No abdominal pain. No nausea, vomiting, diarrhea   GENITOURINARY: No dysuria, frequency or hematuria    PHYSICAL EXAM:  General: Not in distress.  Non-toxic appearing.   Cardio: regular, S1, S2, systolic murmur  Pulm: Clear air entry bilaterally, no wheezing, rales, rhonchi  Abdomen: Soft, non-tender, non-distended, BS+  Extremities: No edema in bilateral LE  Neuro: A&O x3. No focal deficits      01-01 Chol 190 LDL -- HDL 87 Trig 54    ECG:  NSR    TELEMETRY EVENTS:  1-2 second runs of NSVT 1/2-1/4     HPI:    88 yrs old F with PMHx of HTN, HLD, presenting from home with right hip pain status post fall. Patient had mechanical trip and fall down 1 step while trying to take the garbage out.  She landed on right hip.  No head trauma/injury.  Patient has not been able to bear weight/ambulate since fall. Patient notes small abrasion to right elbow.  Patient denies any neck pain, back pain, anticoagulant use, extremity numbness/tingling, abdominal pain, bowel or bladder incontinence. She endorsed Rt. hip pain and limited movement of RLE. She denies hitting head/LOC/anticoag use. Denies HA, dizziness, NVD, fever, SOB, chest pain, abdominal pain, change in urination and change in bowel habits.    In ED, pt. was hypertensive /94 likely due to pain  Labs were unremarkable  CT abd/pelvis: Right pubic tubercle comminuted fracture without significant displacement extending towards the medial superior ramus. Associated right pelvic blood.  Ortho team consulted in ED.  Pt. is being admitted to medicine for further management of the Rt pubic fracture.    Cardiology HPI:  Pt admitted for R pubic fx, no surgical intervention. Has been undergoing PT. Had several short episodes of NSVT on telemetry, seen by EP and likely needs ischemic work up.  Pt reports prior to her most recent fall she has been active at home, doing all her house chores without any issues, never has chest pain, dyspnea, lightheadedness, palpitations. Currently only has difficulties walking and significant allergic reaction to chlorine used to clean the bathroom in her room which is highly bothersome to her. Does not want to undergo any cardiac testing here and wants to continue with PT, either in hospital or at home. Wants to follow up with her doctors at [facility].    States that she had a cholecystectomy 1 year ago and as part of her pre op testing she had an echo which she was told was "good".  PAST MEDICAL & SURGICAL HISTORY  Hypertension    GERD (gastroesophageal reflux disease)    History of surgery  CHOLECYSTECTOMY, HYSTERECTOMY, RIGHT FOOT SURGERY, APPENDECTOMY    FAMILY HISTORY:  FAMILY HISTORY:  No pertinent family history in first degree relatives      ALLERGIES:  No Known Drug Allergies  Chlorine wipes  rash, itching (Rash)      MEDICATIONS:  atorvastatin 40 milliGRAM(s) Oral at bedtime  enoxaparin Injectable 40 milliGRAM(s) SubCutaneous every 24 hours  guaifenesin/dextromethorphan Oral Liquid 10 milliLiter(s) Oral every 6 hours  ketorolac 0.5% Ophthalmic Solution 1 Drop(s) Both EYES four times a day  loratadine 10 milliGRAM(s) Oral daily  losartan 100 milliGRAM(s) Oral daily  magnesium gluconate 500 milliGRAM(s) Oral daily  pantoprazole    Tablet 40 milliGRAM(s) Oral before breakfast    PRN:  acetaminophen     Tablet .. 650 milliGRAM(s) Oral every 6 hours PRN  aluminum hydroxide/magnesium hydroxide/simethicone Suspension 30 milliLiter(s) Oral every 6 hours PRN  melatonin 3 milliGRAM(s) Oral at bedtime PRN  oxyCODONE    IR 5 milliGRAM(s) Oral every 6 hours PRN      HOME MEDICATIONS:  Home Medications:  acetaminophen 325 mg oral tablet: 2 tab(s) orally every 6 hours As needed Temp greater or equal to 38C (100.4F), Mild Pain (1 - 3) (03 Jan 2024 09:44)  ketorolac 0.5% ophthalmic solution: 1 drop(s) in each affected eye 4 times a day (01 Jan 2024 03:13)  losartan 100 mg oral tablet: 1 tab(s) orally once a day (01 Jan 2024 03:13)  omeprazole 20 mg oral delayed release tablet: 1 tab(s) orally once a day (01 Jan 2024 03:13)  oxyCODONE 5 mg oral tablet: 1 tab(s) orally every 6 hours As needed Severe Pain (7 - 10) (03 Jan 2024 09:44)  pravastatin 20 mg oral tablet: 1 tab(s) orally once a day (01 Jan 2024 03:13)      VITALS:   T(F): 99.9 (01-05 @ 05:00), Max: 101.5 (01-03 @ 20:14)  HR: 89 (01-05 @ 05:00) (84 - 113)  BP: 170/81 (01-05 @ 05:00) (140/63 - 179/84)  BP(mean): 102 (01-02 @ 15:29) (102 - 120)  RR: 18 (01-05 @ 05:00) (16 - 18)  SpO2: 94% (01-02 @ 22:39) (94% - 98%)    I&O's Summary    04 Jan 2024 07:01  -  05 Jan 2024 07:00  --------------------------------------------------------  IN: 472 mL / OUT: 350 mL / NET: 122 mL        REVIEW OF SYSTEMS:  CONSTITUTIONAL: No weakness, fevers or chills  RESPIRATORY: No cough, sob  CARDIOVASCULAR: See HPI  GASTROINTESTINAL: No abdominal pain. No nausea, vomiting, diarrhea   GENITOURINARY: No dysuria, frequency or hematuria    PHYSICAL EXAM:  General: Not in distress.  Non-toxic appearing.   Cardio: regular, S1, S2, systolic murmur  Pulm: Clear air entry bilaterally, no wheezing, rales, rhonchi  Abdomen: Soft, non-tender, non-distended, BS+  Extremities: No edema in bilateral LE  Neuro: A&O x3. No focal deficits      01-01 Chol 190 LDL -- HDL 87 Trig 54    ECG:  NSR    TELEMETRY EVENTS:  1-2 second runs of NSVT 1/2-1/4

## 2024-01-05 NOTE — CONSULT NOTE ADULT - ATTENDING COMMENTS
#NSVT  #R Pubic Fracture s/p mechanical fall  #HTN, HLD    Recommendations:    - recommend nuclear stress test, 2D echo.  - Pt does not wish to purse any cardiac workup at this time, wants to start rehab and follow up with her doctors at [facility], so ischemic w/u will be done as outpatient.  - Denies any cardiac symptoms, was functioning prior to fall without any exertional symptoms  - F/u TTE  - Start Toprol 25 mg once daily for NSVT  - if BP is still elevated, add amlodipine  - Please instruct pt to follow up with cardiologist upon discharge  - recall as needed #NSVT  #R Pubic Fracture s/p mechanical fall  #HTN, HLD    Recommendations:    - recommend nuclear stress test, 2D echo.  - Pt does not wish to purse any cardiac workup at this time, wants to start rehab and follow up with her doctors at [facility], so ischemic w/u will be done as outpatient.  - Denies any cardiac symptoms, was functioning prior to fall without any exertional symptoms  - F/u TTE  - Start Toprol 25 mg once daily for NSVT  - if BP is still elevated, add amlodipine  - Please instruct pt to follow up with cardiologist upon discharge  - recall as needed

## 2024-01-05 NOTE — CONSULT NOTE ADULT - ASSESSMENT
#NSVT  #R Pubic Fracture s/p mechanical fall  #HTN, HLD    Recommendations:  - Pt does not wish to purse any cardiac workup at this time, wants to start rehab and follow up with her doctors at [facility]  - Denies any cardiac symptoms, was functioning prior to fall without any exertional symptoms  - F/u TTE  - Start Toprol 25 mg once daily   - Please instruct pt to follow up with cardiologist upon discharge #NSVT  #R Pubic Fracture s/p mechanical fall  #HTN, HLD    Recommendations:  - Pt does not wish to purse any cardiac workup at this time, wants to start rehab and follow up with her doctors at [facility]  - Denies any cardiac symptoms, was functioning prior to fall without any exertional symptoms  - F/u TTE  - Start Toprol 25 mg once daily   - Please instruct pt to follow up with cardiologist upon discharge

## 2024-01-06 PROCEDURE — 99232 SBSQ HOSP IP/OBS MODERATE 35: CPT

## 2024-01-06 RX ADMIN — PANTOPRAZOLE SODIUM 40 MILLIGRAM(S): 20 TABLET, DELAYED RELEASE ORAL at 05:59

## 2024-01-06 RX ADMIN — Medication 1 SPRAY(S): at 05:59

## 2024-01-06 RX ADMIN — Medication 500 MILLIGRAM(S): at 11:18

## 2024-01-06 RX ADMIN — Medication 10 MILLILITER(S): at 17:22

## 2024-01-06 RX ADMIN — Medication 650 MILLIGRAM(S): at 21:37

## 2024-01-06 RX ADMIN — LOSARTAN POTASSIUM 100 MILLIGRAM(S): 100 TABLET, FILM COATED ORAL at 05:58

## 2024-01-06 RX ADMIN — Medication 1 DROP(S): at 21:38

## 2024-01-06 RX ADMIN — ENOXAPARIN SODIUM 40 MILLIGRAM(S): 100 INJECTION SUBCUTANEOUS at 17:22

## 2024-01-06 RX ADMIN — Medication 10 MILLILITER(S): at 05:59

## 2024-01-06 RX ADMIN — Medication 1 DROP(S): at 00:00

## 2024-01-06 RX ADMIN — Medication 25 MILLIGRAM(S): at 05:58

## 2024-01-06 RX ADMIN — ATORVASTATIN CALCIUM 40 MILLIGRAM(S): 80 TABLET, FILM COATED ORAL at 21:37

## 2024-01-06 RX ADMIN — Medication 1 DROP(S): at 11:19

## 2024-01-06 RX ADMIN — Medication 10 MILLILITER(S): at 11:17

## 2024-01-07 LAB
RAPID RVP RESULT: DETECTED
RAPID RVP RESULT: DETECTED
SARS-COV-2 RNA SPEC QL NAA+PROBE: DETECTED
SARS-COV-2 RNA SPEC QL NAA+PROBE: DETECTED

## 2024-01-07 PROCEDURE — 99232 SBSQ HOSP IP/OBS MODERATE 35: CPT

## 2024-01-07 RX ADMIN — PANTOPRAZOLE SODIUM 40 MILLIGRAM(S): 20 TABLET, DELAYED RELEASE ORAL at 05:56

## 2024-01-07 RX ADMIN — Medication 1 DROP(S): at 11:37

## 2024-01-07 RX ADMIN — ENOXAPARIN SODIUM 40 MILLIGRAM(S): 100 INJECTION SUBCUTANEOUS at 17:23

## 2024-01-07 RX ADMIN — Medication 500 MILLIGRAM(S): at 11:38

## 2024-01-07 RX ADMIN — Medication 10 MILLILITER(S): at 11:38

## 2024-01-07 RX ADMIN — Medication 10 MILLILITER(S): at 17:24

## 2024-01-07 RX ADMIN — LORATADINE 10 MILLIGRAM(S): 10 TABLET ORAL at 11:38

## 2024-01-07 RX ADMIN — LOSARTAN POTASSIUM 100 MILLIGRAM(S): 100 TABLET, FILM COATED ORAL at 05:56

## 2024-01-07 RX ADMIN — ATORVASTATIN CALCIUM 40 MILLIGRAM(S): 80 TABLET, FILM COATED ORAL at 22:40

## 2024-01-07 RX ADMIN — Medication 25 MILLIGRAM(S): at 05:56

## 2024-01-07 RX ADMIN — Medication 1 DROP(S): at 17:20

## 2024-01-08 PROCEDURE — 99232 SBSQ HOSP IP/OBS MODERATE 35: CPT

## 2024-01-08 RX ADMIN — LOSARTAN POTASSIUM 100 MILLIGRAM(S): 100 TABLET, FILM COATED ORAL at 05:46

## 2024-01-08 RX ADMIN — PANTOPRAZOLE SODIUM 40 MILLIGRAM(S): 20 TABLET, DELAYED RELEASE ORAL at 05:57

## 2024-01-08 RX ADMIN — Medication 1 DROP(S): at 05:57

## 2024-01-08 RX ADMIN — ATORVASTATIN CALCIUM 40 MILLIGRAM(S): 80 TABLET, FILM COATED ORAL at 21:08

## 2024-01-09 ENCOUNTER — TRANSCRIPTION ENCOUNTER (OUTPATIENT)
Age: 89
End: 2024-01-09

## 2024-01-09 VITALS
DIASTOLIC BLOOD PRESSURE: 78 MMHG | RESPIRATION RATE: 18 BRPM | TEMPERATURE: 98 F | HEART RATE: 97 BPM | SYSTOLIC BLOOD PRESSURE: 164 MMHG

## 2024-01-09 LAB
ANION GAP SERPL CALC-SCNC: 10 MMOL/L — SIGNIFICANT CHANGE UP (ref 7–14)
ANION GAP SERPL CALC-SCNC: 10 MMOL/L — SIGNIFICANT CHANGE UP (ref 7–14)
BUN SERPL-MCNC: 16 MG/DL — SIGNIFICANT CHANGE UP (ref 10–20)
BUN SERPL-MCNC: 16 MG/DL — SIGNIFICANT CHANGE UP (ref 10–20)
CALCIUM SERPL-MCNC: 8.8 MG/DL — SIGNIFICANT CHANGE UP (ref 8.4–10.4)
CALCIUM SERPL-MCNC: 8.8 MG/DL — SIGNIFICANT CHANGE UP (ref 8.4–10.4)
CHLORIDE SERPL-SCNC: 103 MMOL/L — SIGNIFICANT CHANGE UP (ref 98–110)
CHLORIDE SERPL-SCNC: 103 MMOL/L — SIGNIFICANT CHANGE UP (ref 98–110)
CO2 SERPL-SCNC: 24 MMOL/L — SIGNIFICANT CHANGE UP (ref 17–32)
CO2 SERPL-SCNC: 24 MMOL/L — SIGNIFICANT CHANGE UP (ref 17–32)
CREAT SERPL-MCNC: 0.7 MG/DL — SIGNIFICANT CHANGE UP (ref 0.7–1.5)
CREAT SERPL-MCNC: 0.7 MG/DL — SIGNIFICANT CHANGE UP (ref 0.7–1.5)
EGFR: 84 ML/MIN/1.73M2 — SIGNIFICANT CHANGE UP
EGFR: 84 ML/MIN/1.73M2 — SIGNIFICANT CHANGE UP
GLUCOSE SERPL-MCNC: 97 MG/DL — SIGNIFICANT CHANGE UP (ref 70–99)
GLUCOSE SERPL-MCNC: 97 MG/DL — SIGNIFICANT CHANGE UP (ref 70–99)
POTASSIUM SERPL-MCNC: 4.1 MMOL/L — SIGNIFICANT CHANGE UP (ref 3.5–5)
POTASSIUM SERPL-MCNC: 4.1 MMOL/L — SIGNIFICANT CHANGE UP (ref 3.5–5)
POTASSIUM SERPL-SCNC: 4.1 MMOL/L — SIGNIFICANT CHANGE UP (ref 3.5–5)
POTASSIUM SERPL-SCNC: 4.1 MMOL/L — SIGNIFICANT CHANGE UP (ref 3.5–5)
SODIUM SERPL-SCNC: 137 MMOL/L — SIGNIFICANT CHANGE UP (ref 135–146)
SODIUM SERPL-SCNC: 137 MMOL/L — SIGNIFICANT CHANGE UP (ref 135–146)

## 2024-01-09 PROCEDURE — 99239 HOSP IP/OBS DSCHRG MGMT >30: CPT | Mod: GC

## 2024-01-09 RX ORDER — NIFEDIPINE 30 MG
30 TABLET, EXTENDED RELEASE 24 HR ORAL ONCE
Refills: 0 | Status: DISCONTINUED | OUTPATIENT
Start: 2024-01-09 | End: 2024-01-09

## 2024-01-09 RX ORDER — LOSARTAN POTASSIUM 100 MG/1
1 TABLET, FILM COATED ORAL
Qty: 30 | Refills: 0
Start: 2024-01-09 | End: 2024-02-07

## 2024-01-09 RX ORDER — ACETAMINOPHEN 500 MG
2 TABLET ORAL
Qty: 56 | Refills: 0
Start: 2024-01-09 | End: 2024-01-15

## 2024-01-09 RX ORDER — METOPROLOL TARTRATE 50 MG
1 TABLET ORAL
Qty: 30 | Refills: 0
Start: 2024-01-09 | End: 2024-02-07

## 2024-01-09 RX ORDER — CHLORHEXIDINE GLUCONATE 213 G/1000ML
1 SOLUTION TOPICAL DAILY
Refills: 0 | Status: DISCONTINUED | OUTPATIENT
Start: 2024-01-09 | End: 2024-01-09

## 2024-01-09 RX ORDER — ASPIRIN/CALCIUM CARB/MAGNESIUM 324 MG
1 TABLET ORAL
Qty: 42 | Refills: 0
Start: 2024-01-09 | End: 2024-02-19

## 2024-01-09 RX ORDER — LOSARTAN POTASSIUM 100 MG/1
1 TABLET, FILM COATED ORAL
Refills: 0 | DISCHARGE

## 2024-01-09 RX ADMIN — Medication 1 DROP(S): at 06:06

## 2024-01-09 RX ADMIN — Medication 1 DROP(S): at 11:11

## 2024-01-09 RX ADMIN — PANTOPRAZOLE SODIUM 40 MILLIGRAM(S): 20 TABLET, DELAYED RELEASE ORAL at 06:06

## 2024-01-09 RX ADMIN — LOSARTAN POTASSIUM 100 MILLIGRAM(S): 100 TABLET, FILM COATED ORAL at 06:06

## 2024-01-09 RX ADMIN — Medication 650 MILLIGRAM(S): at 09:30

## 2024-01-09 NOTE — PROGRESS NOTE ADULT - SUBJECTIVE AND OBJECTIVE BOX
CARMELLA VILLAFANAA  89y  Female      Patient is a 89y old  Female who presents with a chief complaint of S/p fall (04 Jan 2024 18:39)      INTERVAL HPI/OVERNIGHT EVENTS:  She is frustrated that she was not started acute rehab, she denies any new pain.   Vital Signs Last 24 Hrs  T(C): 36.8 (08 Jan 2024 12:37), Max: 36.8 (08 Jan 2024 12:37)  T(F): 98.3 (08 Jan 2024 12:37), Max: 98.3 (08 Jan 2024 12:37)  HR: 78 (08 Jan 2024 12:37) (77 - 78)  BP: 144/68 (08 Jan 2024 12:37) (144/68 - 161/74)  BP(mean): --  RR: 18 (08 Jan 2024 12:37) (18 - 18)  SpO2: --          01-07-24 @ 07:01  -  01-08-24 @ 07:00  --------------------------------------------------------  IN: 780 mL / OUT: 475 mL / NET: 305 mL            Consultant(s) Notes Reviewed:  [x ] YES  [ ] NO          MEDICATIONS  (STANDING):  atorvastatin 40 milliGRAM(s) Oral at bedtime  enoxaparin Injectable 40 milliGRAM(s) SubCutaneous every 24 hours  guaifenesin/dextromethorphan Oral Liquid 10 milliLiter(s) Oral every 6 hours  ketorolac 0.5% Ophthalmic Solution 1 Drop(s) Both EYES four times a day  loratadine 10 milliGRAM(s) Oral daily  losartan 100 milliGRAM(s) Oral daily  metoprolol succinate ER 25 milliGRAM(s) Oral daily  pantoprazole    Tablet 40 milliGRAM(s) Oral before breakfast    MEDICATIONS  (PRN):  acetaminophen     Tablet .. 650 milliGRAM(s) Oral every 6 hours PRN Temp greater or equal to 38C (100.4F), Mild Pain (1 - 3)  aluminum hydroxide/magnesium hydroxide/simethicone Suspension 30 milliLiter(s) Oral every 6 hours PRN Dyspepsia  melatonin 3 milliGRAM(s) Oral at bedtime PRN Insomnia  oxyCODONE    IR 5 milliGRAM(s) Oral every 6 hours PRN Severe Pain (7 - 10)  sodium chloride 0.65% Nasal 1 Spray(s) Both Nostrils three times a day PRN Nasal Congestion      LABS                  Lactate Trend        CAPILLARY BLOOD GLUCOSE            RADIOLOGY & ADDITIONAL TESTS:    Imaging Personally Reviewed:  [ ] YES  [ ] NO    HEALTH ISSUES - PROBLEM Dx:  Suspected deep vein thrombosis (DVT)    Suspected pulmonary embolism            PHYSICAL EXAM:  GENERAL: NAD, well-developed.  HEAD:  Atraumatic, Normocephalic.  EYES: EOMI, PERRLA, conjunctiva and sclera clear.  NECK: Supple, No JVD.  CHEST/LUNG: Clear to auscultation bilaterally; No wheeze.  HEART: Regular rate and rhythm; S1 S2.   ABDOMEN: Soft, Nontender, Nondistended; Bowel sounds present.  EXTREMITIES:  2+ Peripheral Pulses, small bruise on RLE distal anterior shin  PSYCH: AAOx3.  NEUROLOGY: non-focal.  SKIN: No rashes or lesions.
  VINAY VILLAFANA  89y  Female      Patient is a 89y old  Female who presents with a chief complaint of S/p fall (04 Jan 2024 18:39)      INTERVAL HPI/OVERNIGHT EVENTS:  She feels ok, no new complaints, no fever, no events on tele  Vital Signs Last 24 Hrs  T(C): 36.7 (09 Jan 2024 12:34), Max: 36.8 (08 Jan 2024 20:15)  T(F): 98 (09 Jan 2024 12:34), Max: 98.3 (08 Jan 2024 20:15)  HR: 94 (09 Jan 2024 12:34) (80 - 94)  BP: 158/89 (09 Jan 2024 12:34) (158/89 - 184/83)  BP(mean): --  RR: 18 (09 Jan 2024 12:34) (18 - 18)  SpO2: --                Consultant(s) Notes Reviewed:  [x ] YES  [ ] NO          MEDICATIONS  (STANDING):  atorvastatin 40 milliGRAM(s) Oral at bedtime  chlorhexidine 2% Cloths 1 Application(s) Topical daily  enoxaparin Injectable 40 milliGRAM(s) SubCutaneous every 24 hours  guaifenesin/dextromethorphan Oral Liquid 10 milliLiter(s) Oral every 6 hours  ketorolac 0.5% Ophthalmic Solution 1 Drop(s) Both EYES four times a day  loratadine 10 milliGRAM(s) Oral daily  losartan 100 milliGRAM(s) Oral daily  metoprolol succinate ER 25 milliGRAM(s) Oral daily  pantoprazole    Tablet 40 milliGRAM(s) Oral before breakfast    MEDICATIONS  (PRN):  acetaminophen     Tablet .. 650 milliGRAM(s) Oral every 6 hours PRN Temp greater or equal to 38C (100.4F), Mild Pain (1 - 3)  aluminum hydroxide/magnesium hydroxide/simethicone Suspension 30 milliLiter(s) Oral every 6 hours PRN Dyspepsia  melatonin 3 milliGRAM(s) Oral at bedtime PRN Insomnia  sodium chloride 0.65% Nasal 1 Spray(s) Both Nostrils three times a day PRN Nasal Congestion      LABS      01-09    137  |  103  |  16  ----------------------------<  97  4.1   |  24  |  0.7    Ca    8.8      09 Jan 2024 05:32        Urinalysis Basic - ( 09 Jan 2024 05:32 )    Color: x / Appearance: x / SG: x / pH: x  Gluc: 97 mg/dL / Ketone: x  / Bili: x / Urobili: x   Blood: x / Protein: x / Nitrite: x   Leuk Esterase: x / RBC: x / WBC x   Sq Epi: x / Non Sq Epi: x / Bacteria: x        Lactate Trend        CAPILLARY BLOOD GLUCOSE            RADIOLOGY & ADDITIONAL TESTS:    Imaging Personally Reviewed:  [ ] YES  [ ] NO    HEALTH ISSUES - PROBLEM Dx:  Suspected deep vein thrombosis (DVT)    Suspected pulmonary embolism            PHYSICAL EXAM:  GENERAL: NAD, well-developed.  HEAD:  Atraumatic, Normocephalic.  EYES: EOMI, PERRLA, conjunctiva and sclera clear.  NECK: Supple, No JVD.  CHEST/LUNG: Clear to auscultation bilaterally; No wheeze.  HEART: Regular rate and rhythm; S1 S2.   ABDOMEN: Soft, Nontender, Nondistended; Bowel sounds present.  EXTREMITIES:  2+ Peripheral Pulses, small bruise on RLE distal anterior shin  PSYCH: AAOx3.  NEUROLOGY: non-focal.  SKIN: No rashes or lesions.
  VINAY VILLAFANA  89y  Female      Patient is a 89y old  Female who presents with a chief complaint of S/p fall (04 Jan 2024 18:39)      INTERVAL HPI/OVERNIGHT EVENTS:  She feels ok, she denies abdominal pain or pubic area pain, she had fever last night, no dysuria, cough or chest pain.   Vital Signs Last 24 Hrs  T(C): 36.2 (05 Jan 2024 14:16), Max: 38 (04 Jan 2024 20:48)  T(F): 97.2 (05 Jan 2024 14:16), Max: 100.4 (04 Jan 2024 20:48)  HR: 92 (05 Jan 2024 14:16) (89 - 101)  BP: 149/77 (05 Jan 2024 14:16) (142/67 - 170/81)  BP(mean): --  RR: 16 (05 Jan 2024 14:16) (16 - 18)  SpO2: --          01-04-24 @ 07:01  -  01-05-24 @ 07:00  --------------------------------------------------------  IN: 472 mL / OUT: 350 mL / NET: 122 mL            Consultant(s) Notes Reviewed:  [x ] YES  [ ] NO          MEDICATIONS  (STANDING):  atorvastatin 40 milliGRAM(s) Oral at bedtime  enoxaparin Injectable 40 milliGRAM(s) SubCutaneous every 24 hours  guaifenesin/dextromethorphan Oral Liquid 10 milliLiter(s) Oral every 6 hours  ketorolac 0.5% Ophthalmic Solution 1 Drop(s) Both EYES four times a day  loratadine 10 milliGRAM(s) Oral daily  losartan 100 milliGRAM(s) Oral daily  magnesium gluconate 500 milliGRAM(s) Oral daily  metoprolol succinate ER 25 milliGRAM(s) Oral daily  pantoprazole    Tablet 40 milliGRAM(s) Oral before breakfast    MEDICATIONS  (PRN):  acetaminophen     Tablet .. 650 milliGRAM(s) Oral every 6 hours PRN Temp greater or equal to 38C (100.4F), Mild Pain (1 - 3)  aluminum hydroxide/magnesium hydroxide/simethicone Suspension 30 milliLiter(s) Oral every 6 hours PRN Dyspepsia  melatonin 3 milliGRAM(s) Oral at bedtime PRN Insomnia  oxyCODONE    IR 5 milliGRAM(s) Oral every 6 hours PRN Severe Pain (7 - 10)      LABS                  Lactate Trend  01-01 @ 16:36 Lactate:2.1         CAPILLARY BLOOD GLUCOSE            RADIOLOGY & ADDITIONAL TESTS:    Imaging Personally Reviewed:  [ ] YES  [ ] NO    HEALTH ISSUES - PROBLEM Dx:  Suspected deep vein thrombosis (DVT)    Suspected pulmonary embolism            PHYSICAL EXAM:  GENERAL: NAD, well-developed.  HEAD:  Atraumatic, Normocephalic.  EYES: EOMI, PERRLA, conjunctiva and sclera clear.  NECK: Supple, No JVD.  CHEST/LUNG: Clear to auscultation bilaterally; No wheeze.  HEART: Regular rate and rhythm; S1 S2.   ABDOMEN: Soft, Nontender, Nondistended; Bowel sounds present.  EXTREMITIES:  2+ Peripheral Pulses, No clubbing, cyanosis, or edema.  PSYCH: AAOx3.  NEUROLOGY: non-focal.  SKIN: No rashes or lesions.
Patient is a 89y old  Female who presents with a chief complaint of     Pt seen and examined at bedside. No CP or SOB. Pt was work ing with PT and had presyncope, she states that this has happened multiple times in the past and was told to get up slowly but still happens      PAST MEDICAL & SURGICAL HISTORY:  Hypertension    GERD (gastroesophageal reflux disease)    History of surgery  CHOLECYSTECTOMY, HYSTERECTOMY, RIGHT FOOT SURGERY, APPENDECTOMY        VITAL SIGNS (Last 24 hrs):  T(C): 36.6 (01-02-24 @ 07:27), Max: 37.2 (01-01-24 @ 23:43)  HR: 89 (01-02-24 @ 07:27) (84 - 91)  BP: 179/84 (01-02-24 @ 07:27) (140/63 - 179/84)  RR: 18 (01-02-24 @ 07:27) (18 - 18)  SpO2: 98% (01-02-24 @ 07:27) (96% - 98%)  Wt(kg): --  Daily     Daily     I&O's Summary    01 Jan 2024 07:01  -  02 Jan 2024 07:00  --------------------------------------------------------  IN: 0 mL / OUT: 250 mL / NET: -250 mL    Orthostatic VS    01-02-24 @ 12:12  Lying BP: Orthostatic BP (Lying Systolic): 143/Orthostatic BP (Lying Diastolic (mm Hg)): 77 HR: Orthostatic Pulse (Heart Rate (beats/min)): 89   Sitting BP: Orthostatic BP (Sitting Systolic): 141/Orthostatic BP (Sitting Diastolic (mm Hg)): 77 HR: Orthostatic Pulse (Heart Rate (beats/min)): 88  Standing BP: Orthostatic BP (Standing Systolic): 134/Orthostatic BP (Standing Diastolic (mm Hg)): 78 HR: Orthostatic Pulse (Heart Rate (beats/min)): 104  Site: --   Mode: --          PHYSICAL EXAM:  GENERAL: NAD, elderly   HEAD:  Atraumatic, Normocephalic  EYES: EOMI, PERRLA, conjunctiva and sclera clear  NECK: Supple, No JVD, possible bruit   CHEST/LUNG: Clear to auscultation bilaterally; No wheeze  HEART: Regular rate and rhythm; No murmurs, rubs, or gallops  ABDOMEN: Soft, Nontender, Nondistended; Bowel sounds present  EXTREMITIES:  2+ Peripheral Pulses, No clubbing, cyanosis, or edema  PSYCH: AAOx3  NEUROLOGY: non-focal  SKIN: No rashes or lesions    Labs Reviewed  Spoke to patient in regards to abnormal labs.    CBC Full  -  ( 02 Jan 2024 09:51 )  WBC Count : 6.32 K/uL  Hemoglobin : 13.2 g/dL  Hematocrit : 39.3 %  Platelet Count - Automated : 154 K/uL  Mean Cell Volume : 89.9 fL  Mean Cell Hemoglobin : 30.2 pg  Mean Cell Hemoglobin Concentration : 33.6 g/dL  Auto Neutrophil # : x  Auto Lymphocyte # : x  Auto Monocyte # : x  Auto Eosinophil # : x  Auto Basophil # : x  Auto Neutrophil % : x  Auto Lymphocyte % : x  Auto Monocyte % : x  Auto Eosinophil % : x  Auto Basophil % : x    BMP:    01-02 @ 09:51    Blood Urea Nitrogen - 13  Calcium - 8.9  Carbond Dioxide - 22  Chloride - 103  Creatinine - 0.7  Glucose - 184  Potassium - 3.9  Sodium - 137      Hemoglobin A1c -   PT/INR - ( 31 Dec 2023 22:39 )   PT: 11.50 sec;   INR: 1.01 ratio         PTT - ( 31 Dec 2023 22:39 )  PTT:29.7 sec  Urine Culture:        COVID Labs  CRP:      D-Dimer:            Imaging reviewed independently and reviewed read      < from: Xray Kidney Ureter Bladder (01.01.24 @ 12:46) >  impression:    Nonobstructive bowel gas pattern. See CT and prior plain films for pelvic   fracture evaluation.    < end of copied text >    MEDICATIONS  (STANDING):  atorvastatin 40 milliGRAM(s) Oral at bedtime  enoxaparin Injectable 40 milliGRAM(s) SubCutaneous every 24 hours  ketorolac 0.5% Ophthalmic Solution 1 Drop(s) Both EYES four times a day  lactated ringers. 1000 milliLiter(s) (75 mL/Hr) IV Continuous <Continuous>  losartan 100 milliGRAM(s) Oral daily  magnesium gluconate 500 milliGRAM(s) Oral daily  pantoprazole    Tablet 40 milliGRAM(s) Oral before breakfast    MEDICATIONS  (PRN):  acetaminophen     Tablet .. 650 milliGRAM(s) Oral every 6 hours PRN Temp greater or equal to 38C (100.4F), Mild Pain (1 - 3)  aluminum hydroxide/magnesium hydroxide/simethicone Suspension 30 milliLiter(s) Oral every 6 hours PRN Dyspepsia  melatonin 3 milliGRAM(s) Oral at bedtime PRN Insomnia  oxyCODONE    IR 5 milliGRAM(s) Oral every 6 hours PRN Severe Pain (7 - 10)      
Pt anxious to get to 4a. Denies pain or dizziness    T(F): , Max: 101.5 (01-03-24 @ 20:14)  HR: 102 (01-03-24 @ 20:14) (98 - 113)  BP: 148/79 (01-03-24 @ 20:14)  RR: 18 (01-03-24 @ 20:14)  SpO2: --  IN: 0 mL / OUT: 125 mL / NET: -125 mL    IN: 780 mL / OUT: 1425 mL / NET: -645 mL      General: No apparent distress  Cardiovascular: S1, S2  Gastrointestinal: Soft, Non-tender, Non-distended  Respiratory: Good air entry bilaterally  Musculoskeletal: Moves all extremities  Lymphatic: No edema  Neurologic: No gross motor deficit  Dermatologic: Skin dry                          12.5   5.18  )-----------( 150      ( 03 Jan 2024 06:14 )             37.8     01-03    139  |  103  |  19  ----------------------------<  107<H>  4.9   |  26  |  0.8    Ca    9.1      03 Jan 2024 06:14  Mg     2.1     01-03    TPro  6.9  /  Alb  3.8  /  TBili  0.9  /  DBili  x   /  AST  20  /  ALT  18  /  AlkPhos  73  01-03    
  VINAY VILLAFANA  89y  Female      Patient is a 89y old  Female who presents with a chief complaint of S/p fall (04 Jan 2024 18:39)      INTERVAL HPI/OVERNIGHT EVENTS:  She feels ok, no abdominal pain  Vital Signs Last 24 Hrs  T(C): 37.7 (06 Jan 2024 05:19), Max: 37.7 (06 Jan 2024 05:19)  T(F): 99.8 (06 Jan 2024 05:19), Max: 99.8 (06 Jan 2024 05:19)  HR: 90 (06 Jan 2024 05:19) (90 - 93)  BP: 156/78 (06 Jan 2024 05:19) (142/76 - 156/78)  BP(mean): --  RR: 18 (06 Jan 2024 05:19) (18 - 18)  SpO2: --          01-06-24 @ 07:01  -  01-06-24 @ 16:50  --------------------------------------------------------  IN: 450 mL / OUT: 300 mL / NET: 150 mL            Consultant(s) Notes Reviewed:  [x ] YES  [ ] NO          MEDICATIONS  (STANDING):  atorvastatin 40 milliGRAM(s) Oral at bedtime  enoxaparin Injectable 40 milliGRAM(s) SubCutaneous every 24 hours  guaifenesin/dextromethorphan Oral Liquid 10 milliLiter(s) Oral every 6 hours  ketorolac 0.5% Ophthalmic Solution 1 Drop(s) Both EYES four times a day  loratadine 10 milliGRAM(s) Oral daily  losartan 100 milliGRAM(s) Oral daily  magnesium gluconate 500 milliGRAM(s) Oral daily  metoprolol succinate ER 25 milliGRAM(s) Oral daily  pantoprazole    Tablet 40 milliGRAM(s) Oral before breakfast    MEDICATIONS  (PRN):  acetaminophen     Tablet .. 650 milliGRAM(s) Oral every 6 hours PRN Temp greater or equal to 38C (100.4F), Mild Pain (1 - 3)  aluminum hydroxide/magnesium hydroxide/simethicone Suspension 30 milliLiter(s) Oral every 6 hours PRN Dyspepsia  melatonin 3 milliGRAM(s) Oral at bedtime PRN Insomnia  oxyCODONE    IR 5 milliGRAM(s) Oral every 6 hours PRN Severe Pain (7 - 10)  sodium chloride 0.65% Nasal 1 Spray(s) Both Nostrils three times a day PRN Nasal Congestion      LABS                          12.5   5.17  )-----------( 137      ( 05 Jan 2024 16:36 )             39.4     01-05    136  |  103  |  18  ----------------------------<  110<H>  5.5<H>   |  21  |  0.8    Ca    8.6      05 Jan 2024 16:36  Mg     2.0     01-05        Urinalysis Basic - ( 05 Jan 2024 16:36 )    Color: x / Appearance: x / SG: x / pH: x  Gluc: 110 mg/dL / Ketone: x  / Bili: x / Urobili: x   Blood: x / Protein: x / Nitrite: x   Leuk Esterase: x / RBC: x / WBC x   Sq Epi: x / Non Sq Epi: x / Bacteria: x        Lactate Trend        CAPILLARY BLOOD GLUCOSE            RADIOLOGY & ADDITIONAL TESTS:    Imaging Personally Reviewed:  [ ] YES  [ ] NO    HEALTH ISSUES - PROBLEM Dx:  Suspected deep vein thrombosis (DVT)    Suspected pulmonary embolism            PHYSICAL EXAM:  GENERAL: NAD, well-developed.  HEAD:  Atraumatic, Normocephalic.  EYES: EOMI, PERRLA, conjunctiva and sclera clear.  NECK: Supple, No JVD.  CHEST/LUNG: Clear to auscultation bilaterally; No wheeze.  HEART: Regular rate and rhythm; S1 S2.   ABDOMEN: Soft, Nontender, Nondistended; Bowel sounds present.  EXTREMITIES:  2+ Peripheral Pulses, No clubbing, cyanosis, or edema.  PSYCH: AAOx3.  NEUROLOGY: non-focal.  SKIN: No rashes or lesions.
  BRODERICK VINAY  89y  Female      Patient is a 89y old  Female who presents with a chief complaint of S/p fall (04 Jan 2024 18:39)      INTERVAL HPI/OVERNIGHT EVENTS:  She had mild fever, reports runny nose.   Vital Signs Last 24 Hrs  T(C): 35.9 (07 Jan 2024 14:29), Max: 38.1 (06 Jan 2024 20:29)  T(F): 96.7 (07 Jan 2024 14:29), Max: 100.5 (06 Jan 2024 20:29)  HR: 77 (07 Jan 2024 14:29) (76 - 83)  BP: 119/55 (07 Jan 2024 14:29) (119/55 - 164/85)  BP(mean): --  RR: 18 (07 Jan 2024 14:29) (18 - 18)  SpO2: --          01-06-24 @ 07:01  -  01-07-24 @ 07:00  --------------------------------------------------------  IN: 450 mL / OUT: 300 mL / NET: 150 mL    01-07-24 @ 07:01 - 01-07-24 @ 15:54  --------------------------------------------------------  IN: 780 mL / OUT: 475 mL / NET: 305 mL            Consultant(s) Notes Reviewed:  [x ] YES  [ ] NO          MEDICATIONS  (STANDING):  atorvastatin 40 milliGRAM(s) Oral at bedtime  enoxaparin Injectable 40 milliGRAM(s) SubCutaneous every 24 hours  guaifenesin/dextromethorphan Oral Liquid 10 milliLiter(s) Oral every 6 hours  ketorolac 0.5% Ophthalmic Solution 1 Drop(s) Both EYES four times a day  loratadine 10 milliGRAM(s) Oral daily  losartan 100 milliGRAM(s) Oral daily  metoprolol succinate ER 25 milliGRAM(s) Oral daily  pantoprazole    Tablet 40 milliGRAM(s) Oral before breakfast    MEDICATIONS  (PRN):  acetaminophen     Tablet .. 650 milliGRAM(s) Oral every 6 hours PRN Temp greater or equal to 38C (100.4F), Mild Pain (1 - 3)  aluminum hydroxide/magnesium hydroxide/simethicone Suspension 30 milliLiter(s) Oral every 6 hours PRN Dyspepsia  melatonin 3 milliGRAM(s) Oral at bedtime PRN Insomnia  oxyCODONE    IR 5 milliGRAM(s) Oral every 6 hours PRN Severe Pain (7 - 10)  sodium chloride 0.65% Nasal 1 Spray(s) Both Nostrils three times a day PRN Nasal Congestion      LABS                          12.5   5.17  )-----------( 137      ( 05 Jan 2024 16:36 )             39.4     01-05    136  |  103  |  18  ----------------------------<  110<H>  5.5<H>   |  21  |  0.8    Ca    8.6      05 Jan 2024 16:36  Mg     2.0     01-05        Urinalysis Basic - ( 05 Jan 2024 16:36 )    Color: x / Appearance: x / SG: x / pH: x  Gluc: 110 mg/dL / Ketone: x  / Bili: x / Urobili: x   Blood: x / Protein: x / Nitrite: x   Leuk Esterase: x / RBC: x / WBC x   Sq Epi: x / Non Sq Epi: x / Bacteria: x        Lactate Trend        CAPILLARY BLOOD GLUCOSE            RADIOLOGY & ADDITIONAL TESTS:    Imaging Personally Reviewed:  [ ] YES  [ ] NO    HEALTH ISSUES - PROBLEM Dx:  Suspected deep vein thrombosis (DVT)    Suspected pulmonary embolism            PHYSICAL EXAM:  GENERAL: NAD, well-developed.  HEAD:  Atraumatic, Normocephalic.  EYES: EOMI, PERRLA, conjunctiva and sclera clear.  NECK: Supple, No JVD.  CHEST/LUNG: Clear to auscultation bilaterally; No wheeze.  HEART: Regular rate and rhythm; S1 S2.   ABDOMEN: Soft, Nontender, Nondistended; Bowel sounds present.  EXTREMITIES:  2+ Peripheral Pulses, No clubbing, cyanosis, or edema.  PSYCH: AAOx3.  NEUROLOGY: non-focal.  SKIN: No rashes or lesions.
Patient is a 89y old  Female who presents with a chief complaint of pelvic fx     HPI:  88 yrs old F with PMHx of HTN, HLD, presenting from home with right hip pain status post fall. Patient had mechanical trip and fall down 1 step while trying to take the garbage out.  She landed on right hip.  No head trauma/injury.  Patient has not been able to bear weight/ambulate since fall. Patient notes small abrasion to right elbow.  Patient denies any neck pain, back pain, anticoagulant use, extremity numbness/tingling, abdominal pain, bowel or bladder incontinence. She endorsed Rt. hip pain and limited movement of RLE. She denies hitting head/LOC/anticoag use. Denies HA, dizziness, NVD, fever, SOB, chest pain, abdominal pain, change in urination and change in bowel habits.    In ED, pt. was hypertensive /94 likely due to pain  Labs were unremarkable  CT abd/pelvis: Right pubic tubercle comminuted fracture without significant displacement extending towards the medial superior ramus. Associated right pelvic blood.    Ortho team consulted in ED.    Pt. is being admitted to medicine for further management of the Rt pubic fracture.    #presyncope   -as pt was walking with PT had presyncope  -carotid duplex  -tele, echo  - orthostats neg  - if no events on cardiac telemonitoring consider EP for [facility]           PHYSICAL EXAM    Vital Signs Last 24 Hrs  T(C): 37.7 (03 Jan 2024 04:18), Max: 37.7 (02 Jan 2024 21:39)  T(F): 99.9 (03 Jan 2024 04:18), Max: 99.9 (02 Jan 2024 21:39)  HR: 98 (03 Jan 2024 04:18) (89 - 101)  BP: 166/79 (03 Jan 2024 04:18) (150/71 - 175/89)  BP(mean): 102 (02 Jan 2024 15:29) (102 - 102)  RR: 18 (03 Jan 2024 04:18) (18 - 18)  SpO2: 94% (02 Jan 2024 22:39) (94% - 95%)    Parameters below as of 02 Jan 2024 22:39  Patient On (Oxygen Delivery Method): room air        Constitutional - NAD  Chest - CTA  Cardiovascular - S1S2+  Abdomen -  Soft  Extremities -  No calf tenderness   Function : bed mobility mod A / transfer min A              ambulate 15'RW min A / upper body and lower body dressing min A     acetaminophen     Tablet .. 650 milliGRAM(s) Oral every 6 hours PRN  aluminum hydroxide/magnesium hydroxide/simethicone Suspension 30 milliLiter(s) Oral every 6 hours PRN  atorvastatin 40 milliGRAM(s) Oral at bedtime  enoxaparin Injectable 40 milliGRAM(s) SubCutaneous every 24 hours  ketorolac 0.5% Ophthalmic Solution 1 Drop(s) Both EYES four times a day  lactated ringers. 1000 milliLiter(s) IV Continuous <Continuous>  losartan 100 milliGRAM(s) Oral daily  magnesium gluconate 500 milliGRAM(s) Oral daily  melatonin 3 milliGRAM(s) Oral at bedtime PRN  oxyCODONE    IR 5 milliGRAM(s) Oral every 6 hours PRN  pantoprazole    Tablet 40 milliGRAM(s) Oral before breakfast      RECENT LABS/IMAGING                        12.5   5.18  )-----------( 150      ( 03 Jan 2024 06:14 )             37.8     01-03    139  |  103  |  19  ----------------------------<  107<H>  4.9   |  26  |  0.8    Ca    9.1      03 Jan 2024 06:14  Mg     2.1     01-03    TPro  6.9  /  Alb  3.8  /  TBili  0.9  /  DBili  x   /  AST  20  /  ALT  18  /  AlkPhos  73  01-03      Urinalysis Basic - ( 03 Jan 2024 06:14 )    Color: x / Appearance: x / SG: x / pH: x  Gluc: 107 mg/dL / Ketone: x  / Bili: x / Urobili: x   Blood: x / Protein: x / Nitrite: x   Leuk Esterase: x / RBC: x / WBC x   Sq Epi: x / Non Sq Epi: x / Bacteria: x

## 2024-01-09 NOTE — DISCHARGE NOTE NURSING/CASE MANAGEMENT/SOCIAL WORK - NSDCPEFALRISK_GEN_ALL_CORE
For information on Fall & Injury Prevention, visit: [email or web address] OR  [email or web address] OR  https://www.cdc.gov/steadi/patient.html For information on Fall & Injury Prevention, visit: [email or web address] OR  [email or web address] OR  https://www.cdc.gov/steadi/patient.html

## 2024-01-09 NOTE — DISCHARGE NOTE NURSING/CASE MANAGEMENT/SOCIAL WORK - PATIENT PORTAL LINK FT
You can access the FollowMyHealth Patient Portal offered by [facility] by registering at the following website: [email or web address]. By joining [employer]’s FollowMyHealth portal, you will also be able to view your health information using other applications (apps) compatible with our system. You can access the FollowMyHealth Patient Portal offered by [facility] by registering at the following website: [email or web address]. By joining [employer]’s FollowMyHealth portal, you will also be able to view your health information using other applications (apps) compatible with our system.

## 2024-01-09 NOTE — PROGRESS NOTE ADULT - ASSESSMENT
8 yrs old F with PMHx of HTN, HLD, presenting from home with right hip pain status post fall.     A/P:   Right pubic tubercle fracture s/p Mechanical fall  CT showed Right pubic tubercle comminuted fracture without significant displacement extending towards the medial superior ramus. Associated right pelvic blood.  Rest of trauma work up negative.   Orthopedic evaluation reviewed, no surgical intervention  Fall precaution, seen by PT and physiatry, plan for acute inpatient rehab.   Pain control Oxycodone, Tylenol prn.     Presyncope:   Non-sustained ventricular Tachycardia:   Telemetry showed episodes of NSVT  Seen by cardiology and EP. Patient doesn't want any invasive work up, she refused Loop recorder.   Pending Echo, start on Toprol 25mg po daily.     Fever:   One episode  She denies cough, abdominal pain or dysuria.   Send RVP.     HTN  Continue Losartan 100mg and Metoprolol    HLD  - c/w Lipitor    Recent cataract Sx about 2 weeks ago  - c/w Ketorolac eye gtt    #Progress Note Handoff  Pending (specify):  acute rehab  Family discussion:  Disposition: .   
89 yrs old F with PMHx of HTN, HLD, presenting from home with right hip pain status post fall.     A/P:   Right pubic tubercle fracture s/p Mechanical fall  CT showed Right pubic tubercle comminuted fracture without significant displacement extending towards the medial superior ramus. Associated right pelvic blood.  Rest of trauma work up negative.   Orthopedic evaluation reviewed, no surgical intervention  Fall precaution, seen by PT and physiatry, plan for acute inpatient rehab.   Pain control Oxycodone, Tylenol prn.     Presyncope:   Non-sustained ventricular Tachycardia:   Telemetry showed episodes of NSVT  Seen by cardiology and EP. Patient doesn't want any invasive work up, she refused Loop recorder.   Pending Echo, start on Toprol 25mg po daily.     COVID-19 infection:   Patient with fever, mild cough, runny nose, no hypoxia.   Airborne isolation.     HTN  Continue Losartan 100mg and Metoprolol    HLD: Lipitor    Recent cataract Sx about 2 weeks ago  - c/w Ketorolac eye gtt    #Progress Note Handoff  Pending (specify):  acute rehab  Family discussion:  Disposition: . 
88 yrs old F with PMHx of HTN, HLD, presenting from home with right hip pain status post fall.     #Rt. pubic tubercle fracture s/p Mechanical fall  - pain well controlled  - L arm splinted  - pending 4a auth    #presyncope   - orthostats neg  - if no events on cardiac telemonitoring consider EP for [facility], can be outpt vs at rehab    #HTN  - c/w Losartan 100mg     #Recent cataract Sx   - c/w Ketorolac eye gtt    medocally stable  pending 4a plan
89 yrs old F with PMHx of HTN, HLD, presenting from home with right hip pain status post fall.     A/P:   Right pubic tubercle fracture s/p Mechanical fall  CT showed Right pubic tubercle comminuted fracture without significant displacement extending towards the medial superior ramus. Associated right pelvic blood.  Rest of trauma work up negative.   Orthopedic evaluation reviewed, no surgical intervention  Fall precaution, seen by PT and physiatry, plan for acute inpatient rehab.   Pain control Oxycodone, Tylenol prn.     Presyncope:   Non-sustained ventricular Tachycardia:   Telemetry showed episodes of NSVT  Seen by cardiology and EP. Patient doesn't want any invasive work up, she refused Loop recorder.   Pending Echo, start on Toprol 25mg po daily.     Fever:   One episode  She denies cough, abdominal pain or dysuria.   Send RVP.     HTN  Continue Losartan 100mg and Metoprolol    HLD: Lipitor    Recent cataract Sx about 2 weeks ago  - c/w Ketorolac eye gtt    #Progress Note Handoff  Pending (specify):  acute rehab  Family discussion:  Disposition: . 
89 yrs old F with PMHx of HTN, HLD, presenting from home with right hip pain status post fall.     A/P:   Right pubic tubercle fracture s/p Mechanical fall  CT showed Right pubic tubercle comminuted fracture without significant displacement extending towards the medial superior ramus. Associated right pelvic blood.  Rest of trauma work up negative.   Orthopedic evaluation reviewed, no surgical intervention  Fall precaution, seen by PT and physiatry, recommended acute rehab.   Pain control Oxycodone, Tylenol prn.     Presyncope:   Non-sustained ventricular Tachycardia:   Telemetry showed episodes of NSVT  Seen by cardiology and EP. Patient doesn't want any invasive work up, she refused Loop recorder.   Patient refused echo, start on Toprol 25mg po daily.     COVID-19 infection:   Patient with fever, mild cough, runny nose, no hypoxia.   Airborne isolation. Check D-dimer.     HTN  Continue Losartan 100mg and Metoprolol    HLD: Lipitor    Recent cataract Sx about 2 weeks ago  Ketorolac eye gtt    #Progress Note Handoff  Pending (specify):   Family discussion:  Disposition: Insurance was declined for acute rehab, plan for discharge Home with home PT in 24 hrs.   
89 yrs old F with PMHx of HTN, HLD, presenting from home with right hip pain status post fall.     A/P:   Right pubic tubercle fracture s/p Mechanical fall  CT showed Right pubic tubercle comminuted fracture without significant displacement extending towards the medial superior ramus. Associated right pelvic blood.  Rest of trauma work up negative.   Orthopedic evaluation reviewed, no surgical intervention  Fall precaution, seen by PT and physiatry, recommended acute rehab.   Pain control Oxycodone, Tylenol prn.     Presyncope:   Non-sustained ventricular Tachycardia:   Telemetry showed episodes of NSVT  Seen by cardiology and EP. Patient doesn't want any invasive work up, she refused Loop recorder.   Patient refused echo, start on Toprol 25mg po daily.     COVID-19 infection:   Patient with fever, mild cough, runny nose, no hypoxia.   Airborne isolation. Check D-dimer.     HTN  Continue Losartan 100mg and Metoprolol    HLD: Lipitor    Recent cataract Sx about 2 weeks ago  Ketorolac eye gtt    #Progress Note Handoff  Pending (specify):   Family discussion:  Disposition: Insurance was declined for acute rehab, plan for discharge Home with home PT today.   
Imp: Rehab of Rehab of right pelvic fx / right elbow abrasion, s/p fall, on tele monitoring for presyncope /  HTN, HLD, recent R THR               Prior to hospitalization she  was independent in all activities. Currently she  needs assist with all ADLs and ambulate short distance with walker with assist. She can tolerate 3hr/day PT/OT and medically necessary. She needs acute inpatient rehab to improve functions for safe return home. Pt also needs physiatrist to monitor her medical status and functional progress during her rehab stay. She warrants acute inpatient rehab.     Plan: continue bedside therapy as tolerated          Good 4a acute inpatient rehab candidate when medically cleared                                
88 yrs old F with PMHx of HTN, HLD, presenting from home with right hip pain status post fall.     #Rt. pubic tubercle fracture s/p Mechanical fall  - CT showed Right pubic tubercle comminuted fracture without significant displacement extending towards the medial superior ramus. Associated right pelvic blood.  - F/u x-ray Rt. elbow  - Follow ortho recs, no intervention   - dc morphine, added oxycodone prn , tylenol  - DVT ppx  - PT recc 4A     #presyncope   -as pt was walking with PT had presyncope  -carotid duplex  -tele, echo  - orthostats neg  - if no events on cardiac telemonitoring consider EP for [facility]     #HTN  - BP uncontrolled likely due to pain  - c/w Pain control   - c/w Losartan 100mg     #abd pain likley 2/2 gerd. ppi. kub     #HLD  - c/w Lipitor    #Recent cataract Sx about 2 weeks ago  - c/w Ketorolac eye gtt  -Med rec confirmed with the patient.      #Progress Note Handoff  Pending (specify):  as above  Family discussion: house staff updated pt family  Disposition: up grade to cardiac telemonitoring   Decision to admit the pt is based on acuity as above .

## 2024-01-09 NOTE — CHART NOTE - NSCHARTNOTEFT_GEN_A_CORE
Rolling Walker:  Patient requires rolling walker for safe discharge home due to right pubic tubercle fracture.

## 2024-01-10 ENCOUNTER — NON-APPOINTMENT (OUTPATIENT)
Age: 89
End: 2024-01-10
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
## 2024-01-16 DIAGNOSIS — Z90.49 ACQUIRED ABSENCE OF OTHER SPECIFIED PARTS OF DIGESTIVE TRACT: ICD-10-CM

## 2024-01-16 DIAGNOSIS — E78.5 HYPERLIPIDEMIA, UNSPECIFIED: ICD-10-CM

## 2024-01-16 DIAGNOSIS — I47.20 VENTRICULAR TACHYCARDIA, UNSPECIFIED: ICD-10-CM
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
## 2024-01-16 DIAGNOSIS — U07.1 COVID-19: ICD-10-CM

## 2024-01-16 DIAGNOSIS — I10 ESSENTIAL (PRIMARY) HYPERTENSION: ICD-10-CM

## 2024-01-16 DIAGNOSIS — Z91.048 OTHER NONMEDICINAL SUBSTANCE ALLERGY STATUS: ICD-10-CM

## 2024-01-16 DIAGNOSIS — S32.501A UNSPECIFIED FRACTURE OF RIGHT PUBIS, INITIAL ENCOUNTER FOR CLOSED FRACTURE: ICD-10-CM

## 2024-01-16 DIAGNOSIS — K21.9 GASTRO-ESOPHAGEAL REFLUX DISEASE WITHOUT ESOPHAGITIS: ICD-10-CM

## 2024-02-02 ENCOUNTER — APPOINTMENT (OUTPATIENT)
Dept: OTOLARYNGOLOGY | Facility: CLINIC | Age: 89
End: 2024-02-02
Payer: SELF-PAY

## 2024-02-02 PROCEDURE — V5299A: CUSTOM

## 2024-02-22 ENCOUNTER — APPOINTMENT (OUTPATIENT)
Dept: OTOLARYNGOLOGY | Facility: CLINIC | Age: 89
End: 2024-02-22
Payer: SELF-PAY

## 2024-02-22 PROCEDURE — V5299A: CUSTOM

## 2024-03-18 NOTE — ED PROVIDER NOTE - NSCAREINITIATED _GEN_ER
----- Message from Michelle Lduwig MD sent at 3/16/2024  9:22 AM CDT -----  Please let the patient know:   The biopsy taken of the lining of your stomach was normal. No H.pylori bacteria was seen.   Continue to take your medication as prescribed.     Thank you.    Silvia Armas)

## 2024-04-24 NOTE — DISCHARGE NOTE PROVIDER - NSDCHOSPICE_GEN_A_CORE
-- DO NOT REPLY / DO NOT REPLY ALL --  -- Message is from Engagement Center Operations (ECO) --    General Patient Message: [facility]  [facility] [address] [facility] [address] [phone] concerning Tramdol 50 MG to discuss a medication reaction towards another medication  Fluoxetine 20 MG       Alternative phone number: NO    Can a detailed message be left? Yes    Message Turnaround:                No

## 2024-10-12 NOTE — ED PROVIDER NOTE - WR ORDER ID 1
Discharge instructions given and pt verbalized understanding. Gait steady. No distress noted.  
[id number]

## 2024-11-19 ENCOUNTER — OUTPATIENT (OUTPATIENT)
Dept: OUTPATIENT SERVICES | Facility: HOSPITAL | Age: 89
LOS: 1 days | Discharge: HOME | End: 2024-11-19
Payer: MEDICARE

## 2024-11-19 VITALS
HEIGHT: 65 IN | WEIGHT: 164.91 LBS | RESPIRATION RATE: 16 BRPM | OXYGEN SATURATION: 99 % | TEMPERATURE: 98 F | DIASTOLIC BLOOD PRESSURE: 98 MMHG | HEART RATE: 84 BPM | SYSTOLIC BLOOD PRESSURE: 174 MMHG

## 2024-11-19 VITALS — DIASTOLIC BLOOD PRESSURE: 94 MMHG | HEART RATE: 78 BPM | RESPIRATION RATE: 19 BRPM | SYSTOLIC BLOOD PRESSURE: 169 MMHG

## 2024-11-19 DIAGNOSIS — Z98.41 CATARACT EXTRACTION STATUS, RIGHT EYE: Chronic | ICD-10-CM

## 2024-11-19 DIAGNOSIS — Z98.890 OTHER SPECIFIED POSTPROCEDURAL STATES: Chronic | ICD-10-CM

## 2024-11-19 PROCEDURE — V2632: CPT

## 2024-11-19 NOTE — CHART NOTE - NSCHARTNOTEFT_GEN_A_CORE
PACU ANESTHESIA ADMISSION NOTE      Procedure:   Post op diagnosis:      ____  Intubated  TV:______       Rate: ______      FiO2: ______    __x__  Patent Airway    __x__  Full return of protective reflexes    __x__  Full recovery from anesthesia / back to baseline status    Vitals:  T(C): 36.7 (11-19-24 @ 08:25), Max: 36.7 (11-19-24 @ 07:46)  HR: 84 (11-19-24 @ 08:25) (84 - 84)  BP: 174/98 (11-19-24 @ 08:25) (174/98 - 174/98)  RR: 16 (11-19-24 @ 08:25) (16 - 16)  SpO2: 99% (11-19-24 @ 08:25) (99% - 99%)    Mental Status:  __x__ Awake   ___x__ Alert   _____ Drowsy   _____ Sedated    Nausea/Vomiting:  __x__ NO  ______Yes,   See Post - Op Orders          Pain Scale (0-10):  __0___    Treatment: ____ None    __x__ See Post - Op/PCA Orders    Post - Operative Fluids:   ____ Oral   __x__ See Post - Op Orders    Plan: Discharge:   __x__Home       _____Floor     _____Critical Care    _____  Other:_________________    Comments: Patient had smooth intraoperative event, no anesthesia complication.

## 2024-11-19 NOTE — ASU PATIENT PROFILE, ADULT - NSICDXPASTMEDICALHX_GEN_ALL_CORE_FT
PAST MEDICAL HISTORY:  GERD (gastroesophageal reflux disease)     Hypertension      PAST MEDICAL HISTORY:  Acute UTI     GERD (gastroesophageal reflux disease)     Hypertension

## 2024-11-19 NOTE — ASU PATIENT PROFILE, ADULT - FALL HARM RISK - UNIVERSAL INTERVENTIONS
Bed in lowest position, wheels locked, appropriate side rails in place/Call bell, personal items and telephone in reach/Instruct patient to call for assistance before getting out of bed or chair/Non-slip footwear when patient is out of bed/Angelica to call system/Physically safe environment - no spills, clutter or unnecessary equipment/Purposeful Proactive Rounding/Room/bathroom lighting operational, light cord in reach

## 2024-11-19 NOTE — ASU PATIENT PROFILE, ADULT - NS PRO MODE OF ARRIVAL
Use Debrox ear drops as directed.    Please make an appointment to follow up with [facility] (phone: [phone]) if your symptoms do not resolve.   
ambulatory

## 2024-11-19 NOTE — ASU PATIENT PROFILE, ADULT - NSICDXPASTSURGICALHX_GEN_ALL_CORE_FT
PAST SURGICAL HISTORY:  History of surgery CHOLECYSTECTOMY, HYSTERECTOMY, RIGHT FOOT SURGERY, APPENDECTOMY     PAST SURGICAL HISTORY:  History of surgery CHOLECYSTECTOMY, HYSTERECTOMY, RIGHT FOOT SURGERY, APPENDECTOMY    Status post cataract extraction and insertion of intraocular lens of right eye

## 2024-11-25 DIAGNOSIS — H25.042 POSTERIOR SUBCAPSULAR POLAR AGE-RELATED CATARACT, LEFT EYE: ICD-10-CM

## 2024-11-25 DIAGNOSIS — K21.9 GASTRO-ESOPHAGEAL REFLUX DISEASE WITHOUT ESOPHAGITIS: ICD-10-CM

## 2024-11-25 DIAGNOSIS — I10 ESSENTIAL (PRIMARY) HYPERTENSION: ICD-10-CM

## 2024-11-25 DIAGNOSIS — Z79.82 LONG TERM (CURRENT) USE OF ASPIRIN: ICD-10-CM

## 2025-01-21 PROBLEM — N39.0 URINARY TRACT INFECTION, SITE NOT SPECIFIED: Chronic | Status: ACTIVE | Noted: 2024-11-19

## 2025-01-28 ENCOUNTER — NON-APPOINTMENT (OUTPATIENT)
Age: 89
End: 2025-01-28

## 2025-01-28 ENCOUNTER — APPOINTMENT (OUTPATIENT)
Dept: OTOLARYNGOLOGY | Facility: CLINIC | Age: 89
End: 2025-01-28
Payer: MEDICARE

## 2025-01-28 VITALS — WEIGHT: 156 LBS | BODY MASS INDEX: 25.99 KG/M2 | HEIGHT: 65 IN

## 2025-01-28 DIAGNOSIS — R04.0 EPISTAXIS: ICD-10-CM

## 2025-01-28 DIAGNOSIS — H90.3 SENSORINEURAL HEARING LOSS, BILATERAL: ICD-10-CM

## 2025-01-28 DIAGNOSIS — J34.89 OTHER SPECIFIED DISORDERS OF NOSE AND NASAL SINUSES: ICD-10-CM

## 2025-01-28 PROCEDURE — 99203 OFFICE O/P NEW LOW 30 MIN: CPT | Mod: 25

## 2025-01-28 PROCEDURE — V5299A: CUSTOM

## 2025-01-28 PROCEDURE — 31231 NASAL ENDOSCOPY DX: CPT

## 2025-01-28 PROCEDURE — 92557 COMPREHENSIVE HEARING TEST: CPT

## 2025-03-14 ENCOUNTER — APPOINTMENT (OUTPATIENT)
Dept: OTOLARYNGOLOGY | Facility: CLINIC | Age: 89
End: 2025-03-14
Payer: MEDICARE

## 2025-03-14 PROCEDURE — V5299A: CUSTOM

## 2025-04-24 ENCOUNTER — OUTPATIENT (OUTPATIENT)
Dept: OUTPATIENT SERVICES | Facility: HOSPITAL | Age: 89
LOS: 1 days | End: 2025-04-24
Payer: MEDICARE

## 2025-04-24 DIAGNOSIS — Z00.8 ENCOUNTER FOR OTHER GENERAL EXAMINATION: ICD-10-CM

## 2025-04-24 DIAGNOSIS — Z98.41 CATARACT EXTRACTION STATUS, RIGHT EYE: Chronic | ICD-10-CM

## 2025-04-24 DIAGNOSIS — R07.89 OTHER CHEST PAIN: ICD-10-CM

## 2025-04-24 DIAGNOSIS — Z98.890 OTHER SPECIFIED POSTPROCEDURAL STATES: Chronic | ICD-10-CM

## 2025-04-24 PROCEDURE — 75574 CT ANGIO HRT W/3D IMAGE: CPT | Mod: 26

## 2025-04-24 PROCEDURE — 75574 CT ANGIO HRT W/3D IMAGE: CPT

## 2025-04-25 DIAGNOSIS — R07.89 OTHER CHEST PAIN: ICD-10-CM

## 2025-05-01 ENCOUNTER — OUTPATIENT (OUTPATIENT)
Dept: OUTPATIENT SERVICES | Facility: HOSPITAL | Age: 89
LOS: 1 days | End: 2025-05-01
Payer: MEDICARE

## 2025-05-01 DIAGNOSIS — R93.1 ABNORMAL FINDINGS ON DIAGNOSTIC IMAGING OF HEART AND CORONARY CIRCULATION: ICD-10-CM

## 2025-05-01 DIAGNOSIS — Z98.41 CATARACT EXTRACTION STATUS, RIGHT EYE: Chronic | ICD-10-CM

## 2025-05-01 DIAGNOSIS — Z98.890 OTHER SPECIFIED POSTPROCEDURAL STATES: Chronic | ICD-10-CM

## 2025-05-01 PROCEDURE — 75580 N-INVAS EST C FFR SW ALY CTA: CPT | Mod: 26

## 2025-05-01 PROCEDURE — 75580 N-INVAS EST C FFR SW ALY CTA: CPT

## 2025-05-02 DIAGNOSIS — R93.1 ABNORMAL FINDINGS ON DIAGNOSTIC IMAGING OF HEART AND CORONARY CIRCULATION: ICD-10-CM

## 2025-06-09 ENCOUNTER — APPOINTMENT (OUTPATIENT)
Dept: OTOLARYNGOLOGY | Facility: CLINIC | Age: 89
End: 2025-06-09
Payer: MEDICARE

## 2025-06-09 VITALS — HEIGHT: 65 IN | BODY MASS INDEX: 25.83 KG/M2 | WEIGHT: 155 LBS

## 2025-06-09 PROBLEM — R09.81 NASAL CONGESTION: Status: ACTIVE | Noted: 2025-06-09

## 2025-06-09 PROCEDURE — 99213 OFFICE O/P EST LOW 20 MIN: CPT | Mod: 25

## 2025-06-09 PROCEDURE — 31231 NASAL ENDOSCOPY DX: CPT

## 2025-07-15 ENCOUNTER — INPATIENT (INPATIENT)
Facility: HOSPITAL | Age: 89
LOS: 6 days | Discharge: HOME | DRG: 536 | End: 2025-07-22
Attending: STUDENT IN AN ORGANIZED HEALTH CARE EDUCATION/TRAINING PROGRAM | Admitting: INTERNAL MEDICINE
Payer: MEDICARE

## 2025-07-15 VITALS
HEIGHT: 66 IN | OXYGEN SATURATION: 99 % | DIASTOLIC BLOOD PRESSURE: 76 MMHG | SYSTOLIC BLOOD PRESSURE: 119 MMHG | RESPIRATION RATE: 24 BRPM | WEIGHT: 154.98 LBS | TEMPERATURE: 97 F | HEART RATE: 80 BPM

## 2025-07-15 DIAGNOSIS — S72.002A FRACTURE OF UNSPECIFIED PART OF NECK OF LEFT FEMUR, INITIAL ENCOUNTER FOR CLOSED FRACTURE: ICD-10-CM

## 2025-07-15 DIAGNOSIS — Z98.890 OTHER SPECIFIED POSTPROCEDURAL STATES: Chronic | ICD-10-CM

## 2025-07-15 DIAGNOSIS — Z98.41 CATARACT EXTRACTION STATUS, RIGHT EYE: Chronic | ICD-10-CM

## 2025-07-15 LAB
ALBUMIN SERPL ELPH-MCNC: 4.1 G/DL — SIGNIFICANT CHANGE UP (ref 3.5–5.2)
ALP SERPL-CCNC: 75 U/L — SIGNIFICANT CHANGE UP (ref 30–115)
ALT FLD-CCNC: 13 U/L — SIGNIFICANT CHANGE UP (ref 0–41)
ANION GAP SERPL CALC-SCNC: 16 MMOL/L — HIGH (ref 7–14)
APTT BLD: 26.8 SEC — LOW (ref 27–39.2)
AST SERPL-CCNC: 29 U/L — SIGNIFICANT CHANGE UP (ref 0–41)
BASOPHILS # BLD AUTO: 0.04 K/UL — SIGNIFICANT CHANGE UP (ref 0–0.2)
BASOPHILS NFR BLD AUTO: 0.4 % — SIGNIFICANT CHANGE UP (ref 0–1)
BILIRUB SERPL-MCNC: 0.6 MG/DL — SIGNIFICANT CHANGE UP (ref 0.2–1.2)
BUN SERPL-MCNC: 22 MG/DL — HIGH (ref 10–20)
CALCIUM SERPL-MCNC: 9.2 MG/DL — SIGNIFICANT CHANGE UP (ref 8.4–10.5)
CHLORIDE SERPL-SCNC: 98 MMOL/L — SIGNIFICANT CHANGE UP (ref 98–110)
CO2 SERPL-SCNC: 18 MMOL/L — SIGNIFICANT CHANGE UP (ref 17–32)
CREAT SERPL-MCNC: 1 MG/DL — SIGNIFICANT CHANGE UP (ref 0.7–1.5)
EGFR: 54 ML/MIN/1.73M2 — LOW
EGFR: 54 ML/MIN/1.73M2 — LOW
EOSINOPHIL # BLD AUTO: 0.1 K/UL — SIGNIFICANT CHANGE UP (ref 0–0.7)
EOSINOPHIL NFR BLD AUTO: 1 % — SIGNIFICANT CHANGE UP (ref 0–8)
GLUCOSE SERPL-MCNC: 115 MG/DL — HIGH (ref 70–99)
HCT VFR BLD CALC: 38 % — SIGNIFICANT CHANGE UP (ref 37–47)
HGB BLD-MCNC: 12.8 G/DL — SIGNIFICANT CHANGE UP (ref 12–16)
IMM GRANULOCYTES NFR BLD AUTO: 0.5 % — HIGH (ref 0.1–0.3)
INR BLD: 1.05 RATIO — SIGNIFICANT CHANGE UP (ref 0.65–1.3)
LYMPHOCYTES # BLD AUTO: 2.67 K/UL — SIGNIFICANT CHANGE UP (ref 1.2–3.4)
LYMPHOCYTES # BLD AUTO: 26.7 % — SIGNIFICANT CHANGE UP (ref 20.5–51.1)
MCHC RBC-ENTMCNC: 29.6 PG — SIGNIFICANT CHANGE UP (ref 27–31)
MCHC RBC-ENTMCNC: 33.7 G/DL — SIGNIFICANT CHANGE UP (ref 32–37)
MCV RBC AUTO: 88 FL — SIGNIFICANT CHANGE UP (ref 81–99)
MONOCYTES # BLD AUTO: 0.64 K/UL — HIGH (ref 0.1–0.6)
MONOCYTES NFR BLD AUTO: 6.4 % — SIGNIFICANT CHANGE UP (ref 1.7–9.3)
NEUTROPHILS # BLD AUTO: 6.49 K/UL — SIGNIFICANT CHANGE UP (ref 1.4–6.5)
NEUTROPHILS NFR BLD AUTO: 65 % — SIGNIFICANT CHANGE UP (ref 42.2–75.2)
NRBC BLD AUTO-RTO: 0 /100 WBCS — SIGNIFICANT CHANGE UP (ref 0–0)
PLATELET # BLD AUTO: 164 K/UL — SIGNIFICANT CHANGE UP (ref 130–400)
PMV BLD: 10.5 FL — HIGH (ref 7.4–10.4)
POTASSIUM SERPL-MCNC: 4.8 MMOL/L — SIGNIFICANT CHANGE UP (ref 3.5–5)
POTASSIUM SERPL-SCNC: 4.8 MMOL/L — SIGNIFICANT CHANGE UP (ref 3.5–5)
PROT SERPL-MCNC: 7 G/DL — SIGNIFICANT CHANGE UP (ref 6–8)
PROTHROM AB SERPL-ACNC: 12.4 SEC — SIGNIFICANT CHANGE UP (ref 9.95–12.87)
RBC # BLD: 4.32 M/UL — SIGNIFICANT CHANGE UP (ref 4.2–5.4)
RBC # FLD: 13.2 % — SIGNIFICANT CHANGE UP (ref 11.5–14.5)
SODIUM SERPL-SCNC: 132 MMOL/L — LOW (ref 135–146)
WBC # BLD: 9.99 K/UL — SIGNIFICANT CHANGE UP (ref 4.8–10.8)
WBC # FLD AUTO: 9.99 K/UL — SIGNIFICANT CHANGE UP (ref 4.8–10.8)

## 2025-07-15 PROCEDURE — 73502 X-RAY EXAM HIP UNI 2-3 VIEWS: CPT | Mod: 26,LT

## 2025-07-15 PROCEDURE — 99285 EMERGENCY DEPT VISIT HI MDM: CPT

## 2025-07-15 PROCEDURE — 80053 COMPREHEN METABOLIC PANEL: CPT

## 2025-07-15 PROCEDURE — 93005 ELECTROCARDIOGRAM TRACING: CPT

## 2025-07-15 PROCEDURE — 73552 X-RAY EXAM OF FEMUR 2/>: CPT | Mod: 26,LT

## 2025-07-15 PROCEDURE — 97530 THERAPEUTIC ACTIVITIES: CPT | Mod: GO

## 2025-07-15 PROCEDURE — 88305 TISSUE EXAM BY PATHOLOGIST: CPT

## 2025-07-15 PROCEDURE — 73501 X-RAY EXAM HIP UNI 1 VIEW: CPT | Mod: LT

## 2025-07-15 PROCEDURE — 84100 ASSAY OF PHOSPHORUS: CPT

## 2025-07-15 PROCEDURE — 97165 OT EVAL LOW COMPLEX 30 MIN: CPT | Mod: GO

## 2025-07-15 PROCEDURE — 97116 GAIT TRAINING THERAPY: CPT | Mod: GP

## 2025-07-15 PROCEDURE — 73590 X-RAY EXAM OF LOWER LEG: CPT | Mod: 26,LT

## 2025-07-15 PROCEDURE — C1713: CPT

## 2025-07-15 PROCEDURE — 36415 COLL VENOUS BLD VENIPUNCTURE: CPT

## 2025-07-15 PROCEDURE — 83735 ASSAY OF MAGNESIUM: CPT

## 2025-07-15 PROCEDURE — 73564 X-RAY EXAM KNEE 4 OR MORE: CPT | Mod: 26,LT

## 2025-07-15 PROCEDURE — 85027 COMPLETE CBC AUTOMATED: CPT

## 2025-07-15 PROCEDURE — 99223 1ST HOSP IP/OBS HIGH 75: CPT

## 2025-07-15 PROCEDURE — 80048 BASIC METABOLIC PNL TOTAL CA: CPT

## 2025-07-15 PROCEDURE — 72192 CT PELVIS W/O DYE: CPT | Mod: 26

## 2025-07-15 PROCEDURE — 97110 THERAPEUTIC EXERCISES: CPT | Mod: GP

## 2025-07-15 PROCEDURE — 86900 BLOOD TYPING SEROLOGIC ABO: CPT

## 2025-07-15 PROCEDURE — 88311 DECALCIFY TISSUE: CPT

## 2025-07-15 PROCEDURE — 86850 RBC ANTIBODY SCREEN: CPT

## 2025-07-15 PROCEDURE — 86901 BLOOD TYPING SEROLOGIC RH(D): CPT

## 2025-07-15 PROCEDURE — 97162 PT EVAL MOD COMPLEX 30 MIN: CPT | Mod: GP

## 2025-07-15 PROCEDURE — 85025 COMPLETE CBC W/AUTO DIFF WBC: CPT

## 2025-07-15 PROCEDURE — C1776: CPT

## 2025-07-15 PROCEDURE — 73706 CT ANGIO LWR EXTR W/O&W/DYE: CPT | Mod: LT

## 2025-07-15 PROCEDURE — 93970 EXTREMITY STUDY: CPT

## 2025-07-15 RX ORDER — METOPROLOL SUCCINATE 50 MG/1
0 TABLET, EXTENDED RELEASE ORAL
Qty: 0 | Refills: 0 | DISCHARGE

## 2025-07-15 RX ORDER — LOSARTAN POTASSIUM 100 MG/1
100 TABLET, FILM COATED ORAL DAILY
Refills: 0 | Status: DISCONTINUED | OUTPATIENT
Start: 2025-07-15 | End: 2025-07-18

## 2025-07-15 RX ORDER — TRAMADOL HYDROCHLORIDE 50 MG/1
25 TABLET, FILM COATED ORAL EVERY 4 HOURS
Refills: 0 | Status: DISCONTINUED | OUTPATIENT
Start: 2025-07-15 | End: 2025-07-17

## 2025-07-15 RX ORDER — ROSUVASTATIN CALCIUM 20 MG/1
40 TABLET, FILM COATED ORAL AT BEDTIME
Refills: 0 | Status: DISCONTINUED | OUTPATIENT
Start: 2025-07-15 | End: 2025-07-22

## 2025-07-15 RX ORDER — MELATONIN 5 MG
3 TABLET ORAL AT BEDTIME
Refills: 0 | Status: DISCONTINUED | OUTPATIENT
Start: 2025-07-15 | End: 2025-07-22

## 2025-07-15 RX ORDER — ENOXAPARIN SODIUM 100 MG/ML
40 INJECTION SUBCUTANEOUS ONCE
Refills: 0 | Status: DISCONTINUED | OUTPATIENT
Start: 2025-07-15 | End: 2025-07-18

## 2025-07-15 RX ORDER — NITROGLYCERIN 20 MG/G
0 OINTMENT TOPICAL
Qty: 0 | Refills: 4 | DISCHARGE

## 2025-07-15 RX ORDER — ONDANSETRON HCL/PF 4 MG/2 ML
4 VIAL (ML) INJECTION ONCE
Refills: 0 | Status: COMPLETED | OUTPATIENT
Start: 2025-07-15 | End: 2025-07-15

## 2025-07-15 RX ORDER — TRAZODONE HCL 100 MG
50 TABLET ORAL AT BEDTIME
Refills: 0 | Status: DISCONTINUED | OUTPATIENT
Start: 2025-07-15 | End: 2025-07-22

## 2025-07-15 RX ORDER — ROSUVASTATIN CALCIUM 20 MG/1
0 TABLET, FILM COATED ORAL
Qty: 0 | Refills: 3 | DISCHARGE

## 2025-07-15 RX ORDER — ONDANSETRON HCL/PF 4 MG/2 ML
4 VIAL (ML) INJECTION EVERY 8 HOURS
Refills: 0 | Status: DISCONTINUED | OUTPATIENT
Start: 2025-07-15 | End: 2025-07-17

## 2025-07-15 RX ORDER — METOPROLOL SUCCINATE 50 MG/1
50 TABLET, EXTENDED RELEASE ORAL DAILY
Refills: 0 | Status: DISCONTINUED | OUTPATIENT
Start: 2025-07-15 | End: 2025-07-22

## 2025-07-15 RX ORDER — ONDANSETRON HCL/PF 4 MG/2 ML
4 VIAL (ML) INJECTION ONCE
Refills: 0 | Status: DISCONTINUED | OUTPATIENT
Start: 2025-07-15 | End: 2025-07-15

## 2025-07-15 RX ORDER — MAGNESIUM, ALUMINUM HYDROXIDE 200-200 MG
30 TABLET,CHEWABLE ORAL EVERY 4 HOURS
Refills: 0 | Status: DISCONTINUED | OUTPATIENT
Start: 2025-07-15 | End: 2025-07-22

## 2025-07-15 RX ORDER — CILOSTAZOL 50 MG/1
50 TABLET ORAL
Refills: 0 | Status: DISCONTINUED | OUTPATIENT
Start: 2025-07-15 | End: 2025-07-22

## 2025-07-15 RX ORDER — OMEPRAZOLE 20 MG/1
0 CAPSULE, DELAYED RELEASE ORAL
Qty: 0 | Refills: 0 | DISCHARGE

## 2025-07-15 RX ORDER — TRAZODONE HCL 100 MG
0 TABLET ORAL
Qty: 0 | Refills: 0 | DISCHARGE

## 2025-07-15 RX ORDER — ACETAMINOPHEN 500 MG/5ML
650 LIQUID (ML) ORAL EVERY 6 HOURS
Refills: 0 | Status: DISCONTINUED | OUTPATIENT
Start: 2025-07-15 | End: 2025-07-22

## 2025-07-15 RX ADMIN — Medication 4 MILLIGRAM(S): at 21:10

## 2025-07-15 RX ADMIN — Medication 4 MILLIGRAM(S): at 23:31

## 2025-07-15 RX ADMIN — Medication 4 MILLIGRAM(S): at 18:17

## 2025-07-15 RX ADMIN — Medication 4 MILLIGRAM(S): at 18:57

## 2025-07-15 RX ADMIN — Medication 4 MILLIGRAM(S): at 21:38

## 2025-07-16 DIAGNOSIS — Z51.5 ENCOUNTER FOR PALLIATIVE CARE: ICD-10-CM

## 2025-07-16 DIAGNOSIS — S72.002A FRACTURE OF UNSPECIFIED PART OF NECK OF LEFT FEMUR, INITIAL ENCOUNTER FOR CLOSED FRACTURE: ICD-10-CM

## 2025-07-16 DIAGNOSIS — R52 PAIN, UNSPECIFIED: ICD-10-CM

## 2025-07-16 LAB
ANION GAP SERPL CALC-SCNC: 15 MMOL/L — HIGH (ref 7–14)
BLD GP AB SCN SERPL QL: SIGNIFICANT CHANGE UP
BUN SERPL-MCNC: 22 MG/DL — HIGH (ref 10–20)
CALCIUM SERPL-MCNC: 9.1 MG/DL — SIGNIFICANT CHANGE UP (ref 8.4–10.5)
CHLORIDE SERPL-SCNC: 99 MMOL/L — SIGNIFICANT CHANGE UP (ref 98–110)
CO2 SERPL-SCNC: 23 MMOL/L — SIGNIFICANT CHANGE UP (ref 17–32)
CREAT SERPL-MCNC: 0.9 MG/DL — SIGNIFICANT CHANGE UP (ref 0.7–1.5)
EGFR: 61 ML/MIN/1.73M2 — SIGNIFICANT CHANGE UP
EGFR: 61 ML/MIN/1.73M2 — SIGNIFICANT CHANGE UP
GLUCOSE SERPL-MCNC: 126 MG/DL — HIGH (ref 70–99)
HCT VFR BLD CALC: 37.2 % — SIGNIFICANT CHANGE UP (ref 34.5–45)
HGB BLD-MCNC: 12.6 G/DL — SIGNIFICANT CHANGE UP (ref 11.5–15.5)
MAGNESIUM SERPL-MCNC: 1.9 MG/DL — SIGNIFICANT CHANGE UP (ref 1.8–2.4)
MCHC RBC-ENTMCNC: 29.9 PG — SIGNIFICANT CHANGE UP (ref 27–34)
MCHC RBC-ENTMCNC: 33.9 G/DL — SIGNIFICANT CHANGE UP (ref 32–36)
MCV RBC AUTO: 88.2 FL — SIGNIFICANT CHANGE UP (ref 80–100)
NRBC # BLD AUTO: 0 K/UL — SIGNIFICANT CHANGE UP (ref 0–0)
NRBC # FLD: 0 K/UL — SIGNIFICANT CHANGE UP (ref 0–0)
NRBC BLD AUTO-RTO: 0 /100 WBCS — SIGNIFICANT CHANGE UP (ref 0–0)
PHOSPHATE SERPL-MCNC: 4.2 MG/DL — SIGNIFICANT CHANGE UP (ref 2.1–4.9)
PLATELET # BLD AUTO: 139 K/UL — LOW (ref 150–400)
PMV BLD: 10.3 FL — SIGNIFICANT CHANGE UP (ref 7–13)
POTASSIUM SERPL-MCNC: 4.4 MMOL/L — SIGNIFICANT CHANGE UP (ref 3.5–5)
POTASSIUM SERPL-SCNC: 4.4 MMOL/L — SIGNIFICANT CHANGE UP (ref 3.5–5)
RBC # BLD: 4.22 M/UL — SIGNIFICANT CHANGE UP (ref 3.8–5.2)
RBC # FLD: 13.2 % — SIGNIFICANT CHANGE UP (ref 10.3–14.5)
SODIUM SERPL-SCNC: 137 MMOL/L — SIGNIFICANT CHANGE UP (ref 135–146)
WBC # BLD: 8.63 K/UL — SIGNIFICANT CHANGE UP (ref 3.8–10.5)
WBC # FLD AUTO: 8.63 K/UL — SIGNIFICANT CHANGE UP (ref 3.8–10.5)

## 2025-07-16 PROCEDURE — 99223 1ST HOSP IP/OBS HIGH 75: CPT

## 2025-07-16 PROCEDURE — 93010 ELECTROCARDIOGRAM REPORT: CPT

## 2025-07-16 PROCEDURE — 99232 SBSQ HOSP IP/OBS MODERATE 35: CPT

## 2025-07-16 RX ORDER — PROCHLORPERAZINE 25 MG
10 SUPPOSITORY, RECTAL RECTAL ONCE
Refills: 0 | Status: COMPLETED | OUTPATIENT
Start: 2025-07-16 | End: 2025-07-16

## 2025-07-16 RX ADMIN — Medication 4 MILLIGRAM(S): at 19:35

## 2025-07-16 RX ADMIN — ROSUVASTATIN CALCIUM 40 MILLIGRAM(S): 20 TABLET, FILM COATED ORAL at 21:04

## 2025-07-16 RX ADMIN — Medication 10 MILLIGRAM(S): at 04:54

## 2025-07-16 RX ADMIN — Medication 4 MILLIGRAM(S): at 01:35

## 2025-07-16 RX ADMIN — Medication 40 MILLIGRAM(S): at 11:27

## 2025-07-16 RX ADMIN — Medication 4 MILLIGRAM(S): at 20:05

## 2025-07-16 RX ADMIN — Medication 4 MILLIGRAM(S): at 11:22

## 2025-07-16 RX ADMIN — Medication 4 MILLIGRAM(S): at 00:53

## 2025-07-16 RX ADMIN — METOPROLOL SUCCINATE 50 MILLIGRAM(S): 50 TABLET, EXTENDED RELEASE ORAL at 06:06

## 2025-07-16 RX ADMIN — CILOSTAZOL 50 MILLIGRAM(S): 50 TABLET ORAL at 18:43

## 2025-07-16 RX ADMIN — Medication 4 MILLIGRAM(S): at 10:52

## 2025-07-17 ENCOUNTER — RESULT REVIEW (OUTPATIENT)
Age: 89
End: 2025-07-17

## 2025-07-17 LAB
ALBUMIN SERPL ELPH-MCNC: 3.6 G/DL — SIGNIFICANT CHANGE UP (ref 3.5–5.2)
ALP SERPL-CCNC: 70 U/L — SIGNIFICANT CHANGE UP (ref 30–115)
ALT FLD-CCNC: 12 U/L — SIGNIFICANT CHANGE UP (ref 0–41)
ANION GAP SERPL CALC-SCNC: 12 MMOL/L — SIGNIFICANT CHANGE UP (ref 7–14)
AST SERPL-CCNC: 20 U/L — SIGNIFICANT CHANGE UP (ref 0–41)
BASOPHILS # BLD AUTO: 0.04 K/UL — SIGNIFICANT CHANGE UP (ref 0–0.2)
BASOPHILS NFR BLD AUTO: 0.6 % — SIGNIFICANT CHANGE UP (ref 0–2)
BILIRUB SERPL-MCNC: 0.9 MG/DL — SIGNIFICANT CHANGE UP (ref 0.2–1.2)
BUN SERPL-MCNC: 20 MG/DL — SIGNIFICANT CHANGE UP (ref 10–20)
CALCIUM SERPL-MCNC: 8.9 MG/DL — SIGNIFICANT CHANGE UP (ref 8.4–10.5)
CHLORIDE SERPL-SCNC: 103 MMOL/L — SIGNIFICANT CHANGE UP (ref 98–110)
CO2 SERPL-SCNC: 23 MMOL/L — SIGNIFICANT CHANGE UP (ref 17–32)
CREAT SERPL-MCNC: 0.7 MG/DL — SIGNIFICANT CHANGE UP (ref 0.7–1.5)
EGFR: 83 ML/MIN/1.73M2 — SIGNIFICANT CHANGE UP
EGFR: 83 ML/MIN/1.73M2 — SIGNIFICANT CHANGE UP
EOSINOPHIL # BLD AUTO: 0.18 K/UL — SIGNIFICANT CHANGE UP (ref 0–0.5)
EOSINOPHIL NFR BLD AUTO: 2.6 % — SIGNIFICANT CHANGE UP (ref 0–6)
GLUCOSE SERPL-MCNC: 102 MG/DL — HIGH (ref 70–99)
HCT VFR BLD CALC: 38.6 % — SIGNIFICANT CHANGE UP (ref 34.5–45)
HGB BLD-MCNC: 12.5 G/DL — SIGNIFICANT CHANGE UP (ref 11.5–15.5)
IMM GRANULOCYTES # BLD AUTO: 0.02 K/UL — SIGNIFICANT CHANGE UP (ref 0–0.07)
IMM GRANULOCYTES NFR BLD AUTO: 0.3 % — SIGNIFICANT CHANGE UP (ref 0–0.9)
LYMPHOCYTES # BLD AUTO: 1.3 K/UL — SIGNIFICANT CHANGE UP (ref 1–3.3)
LYMPHOCYTES NFR BLD AUTO: 19 % — SIGNIFICANT CHANGE UP (ref 13–44)
MAGNESIUM SERPL-MCNC: 1.8 MG/DL — SIGNIFICANT CHANGE UP (ref 1.8–2.4)
MCHC RBC-ENTMCNC: 28.9 PG — SIGNIFICANT CHANGE UP (ref 27–34)
MCHC RBC-ENTMCNC: 32.4 G/DL — SIGNIFICANT CHANGE UP (ref 32–36)
MCV RBC AUTO: 89.4 FL — SIGNIFICANT CHANGE UP (ref 80–100)
MONOCYTES # BLD AUTO: 0.55 K/UL — SIGNIFICANT CHANGE UP (ref 0–0.9)
MONOCYTES NFR BLD AUTO: 8 % — SIGNIFICANT CHANGE UP (ref 2–14)
NEUTROPHILS # BLD AUTO: 4.76 K/UL — SIGNIFICANT CHANGE UP (ref 1.8–7.4)
NEUTROPHILS NFR BLD AUTO: 69.5 % — SIGNIFICANT CHANGE UP (ref 43–77)
NRBC # BLD AUTO: 0 K/UL — SIGNIFICANT CHANGE UP (ref 0–0)
NRBC # FLD: 0 K/UL — SIGNIFICANT CHANGE UP (ref 0–0)
NRBC BLD AUTO-RTO: 0 /100 WBCS — SIGNIFICANT CHANGE UP (ref 0–0)
PLATELET # BLD AUTO: 120 K/UL — LOW (ref 150–400)
PMV BLD: 9.8 FL — SIGNIFICANT CHANGE UP (ref 7–13)
POTASSIUM SERPL-MCNC: 4.2 MMOL/L — SIGNIFICANT CHANGE UP (ref 3.5–5)
POTASSIUM SERPL-SCNC: 4.2 MMOL/L — SIGNIFICANT CHANGE UP (ref 3.5–5)
PROT SERPL-MCNC: 6.4 G/DL — SIGNIFICANT CHANGE UP (ref 6–8)
RBC # BLD: 4.32 M/UL — SIGNIFICANT CHANGE UP (ref 3.8–5.2)
RBC # FLD: 13.4 % — SIGNIFICANT CHANGE UP (ref 10.3–14.5)
SODIUM SERPL-SCNC: 138 MMOL/L — SIGNIFICANT CHANGE UP (ref 135–146)
WBC # BLD: 6.85 K/UL — SIGNIFICANT CHANGE UP (ref 3.8–10.5)
WBC # FLD AUTO: 6.85 K/UL — SIGNIFICANT CHANGE UP (ref 3.8–10.5)

## 2025-07-17 PROCEDURE — 99221 1ST HOSP IP/OBS SF/LOW 40: CPT | Mod: 57

## 2025-07-17 PROCEDURE — 27130 TOTAL HIP ARTHROPLASTY: CPT | Mod: LT

## 2025-07-17 PROCEDURE — 88305 TISSUE EXAM BY PATHOLOGIST: CPT | Mod: 26

## 2025-07-17 PROCEDURE — 88311 DECALCIFY TISSUE: CPT | Mod: 26

## 2025-07-17 PROCEDURE — 99233 SBSQ HOSP IP/OBS HIGH 50: CPT

## 2025-07-17 PROCEDURE — 99232 SBSQ HOSP IP/OBS MODERATE 35: CPT

## 2025-07-17 RX ORDER — MAGNESIUM HYDROXIDE 400 MG/5ML
30 SUSPENSION ORAL DAILY
Refills: 0 | Status: DISCONTINUED | OUTPATIENT
Start: 2025-07-17 | End: 2025-07-22

## 2025-07-17 RX ORDER — SENNA 187 MG
2 TABLET ORAL AT BEDTIME
Refills: 0 | Status: DISCONTINUED | OUTPATIENT
Start: 2025-07-17 | End: 2025-07-22

## 2025-07-17 RX ORDER — KETOROLAC TROMETHAMINE 30 MG/ML
30 INJECTION, SOLUTION INTRAMUSCULAR; INTRAVENOUS ONCE
Refills: 0 | Status: DISCONTINUED | OUTPATIENT
Start: 2025-07-17 | End: 2025-07-17

## 2025-07-17 RX ORDER — TRAMADOL HYDROCHLORIDE 50 MG/1
50 TABLET, FILM COATED ORAL EVERY 6 HOURS
Refills: 0 | Status: DISCONTINUED | OUTPATIENT
Start: 2025-07-17 | End: 2025-07-22

## 2025-07-17 RX ORDER — ACETAMINOPHEN 500 MG/5ML
1000 LIQUID (ML) ORAL ONCE
Refills: 0 | Status: DISCONTINUED | OUTPATIENT
Start: 2025-07-17 | End: 2025-07-17

## 2025-07-17 RX ORDER — CELECOXIB 50 MG/1
200 CAPSULE ORAL EVERY 12 HOURS
Refills: 0 | Status: DISCONTINUED | OUTPATIENT
Start: 2025-07-18 | End: 2025-07-21

## 2025-07-17 RX ORDER — SODIUM CHLORIDE 9 G/1000ML
1000 INJECTION, SOLUTION INTRAVENOUS
Refills: 0 | Status: DISCONTINUED | OUTPATIENT
Start: 2025-07-17 | End: 2025-07-17

## 2025-07-17 RX ORDER — ACETAMINOPHEN 500 MG/5ML
650 LIQUID (ML) ORAL EVERY 6 HOURS
Refills: 0 | Status: COMPLETED | OUTPATIENT
Start: 2025-07-17 | End: 2025-07-20

## 2025-07-17 RX ORDER — HYDROMORPHONE/SOD CHLOR,ISO/PF 2 MG/10 ML
0.5 SYRINGE (ML) INJECTION
Refills: 0 | Status: DISCONTINUED | OUTPATIENT
Start: 2025-07-17 | End: 2025-07-17

## 2025-07-17 RX ORDER — CEFAZOLIN SODIUM IN 0.9 % NACL 3 G/100 ML
2000 INTRAVENOUS SOLUTION, PIGGYBACK (ML) INTRAVENOUS EVERY 8 HOURS
Refills: 0 | Status: COMPLETED | OUTPATIENT
Start: 2025-07-17 | End: 2025-07-18

## 2025-07-17 RX ORDER — ONDANSETRON HCL/PF 4 MG/2 ML
4 VIAL (ML) INJECTION EVERY 6 HOURS
Refills: 0 | Status: DISCONTINUED | OUTPATIENT
Start: 2025-07-17 | End: 2025-07-22

## 2025-07-17 RX ORDER — ASPIRIN 325 MG
81 TABLET ORAL EVERY 12 HOURS
Refills: 0 | Status: DISCONTINUED | OUTPATIENT
Start: 2025-07-17 | End: 2025-07-21

## 2025-07-17 RX ORDER — DEXAMETHASONE 0.5 MG/1
2 TABLET ORAL ONCE
Refills: 0 | Status: COMPLETED | OUTPATIENT
Start: 2025-07-17 | End: 2025-07-19

## 2025-07-17 RX ORDER — POLYETHYLENE GLYCOL 3350 17 G/17G
17 POWDER, FOR SOLUTION ORAL AT BEDTIME
Refills: 0 | Status: DISCONTINUED | OUTPATIENT
Start: 2025-07-17 | End: 2025-07-22

## 2025-07-17 RX ADMIN — POLYETHYLENE GLYCOL 3350 17 GRAM(S): 17 POWDER, FOR SOLUTION ORAL at 21:46

## 2025-07-17 RX ADMIN — METOPROLOL SUCCINATE 50 MILLIGRAM(S): 50 TABLET, EXTENDED RELEASE ORAL at 05:15

## 2025-07-17 RX ADMIN — ROSUVASTATIN CALCIUM 40 MILLIGRAM(S): 20 TABLET, FILM COATED ORAL at 21:45

## 2025-07-17 RX ADMIN — Medication 40 MILLIGRAM(S): at 05:16

## 2025-07-17 RX ADMIN — Medication 2 TABLET(S): at 21:45

## 2025-07-17 RX ADMIN — CILOSTAZOL 50 MILLIGRAM(S): 50 TABLET ORAL at 05:15

## 2025-07-17 RX ADMIN — LOSARTAN POTASSIUM 100 MILLIGRAM(S): 100 TABLET, FILM COATED ORAL at 05:15

## 2025-07-18 LAB
ANION GAP SERPL CALC-SCNC: 14 MMOL/L — SIGNIFICANT CHANGE UP (ref 7–14)
BASOPHILS # BLD AUTO: 0.01 K/UL — SIGNIFICANT CHANGE UP (ref 0–0.2)
BASOPHILS NFR BLD AUTO: 0.1 % — SIGNIFICANT CHANGE UP (ref 0–2)
BUN SERPL-MCNC: 30 MG/DL — HIGH (ref 10–20)
CALCIUM SERPL-MCNC: 8.1 MG/DL — LOW (ref 8.4–10.5)
CHLORIDE SERPL-SCNC: 98 MMOL/L — SIGNIFICANT CHANGE UP (ref 98–110)
CO2 SERPL-SCNC: 22 MMOL/L — SIGNIFICANT CHANGE UP (ref 17–32)
CREAT SERPL-MCNC: 1.3 MG/DL — SIGNIFICANT CHANGE UP (ref 0.7–1.5)
EGFR: 40 ML/MIN/1.73M2 — LOW
EGFR: 40 ML/MIN/1.73M2 — LOW
EOSINOPHIL # BLD AUTO: 0 K/UL — SIGNIFICANT CHANGE UP (ref 0–0.5)
EOSINOPHIL NFR BLD AUTO: 0 % — SIGNIFICANT CHANGE UP (ref 0–6)
GLUCOSE SERPL-MCNC: 164 MG/DL — HIGH (ref 70–99)
HCT VFR BLD CALC: 30.2 % — LOW (ref 34.5–45)
HGB BLD-MCNC: 10.2 G/DL — LOW (ref 11.5–15.5)
IMM GRANULOCYTES # BLD AUTO: 0.06 K/UL — SIGNIFICANT CHANGE UP (ref 0–0.07)
IMM GRANULOCYTES NFR BLD AUTO: 0.5 % — SIGNIFICANT CHANGE UP (ref 0–0.9)
LYMPHOCYTES # BLD AUTO: 0.93 K/UL — LOW (ref 1–3.3)
LYMPHOCYTES NFR BLD AUTO: 8 % — LOW (ref 13–44)
MCHC RBC-ENTMCNC: 29.8 PG — SIGNIFICANT CHANGE UP (ref 27–34)
MCHC RBC-ENTMCNC: 33.8 G/DL — SIGNIFICANT CHANGE UP (ref 32–36)
MCV RBC AUTO: 88.3 FL — SIGNIFICANT CHANGE UP (ref 80–100)
MONOCYTES # BLD AUTO: 0.73 K/UL — SIGNIFICANT CHANGE UP (ref 0–0.9)
MONOCYTES NFR BLD AUTO: 6.3 % — SIGNIFICANT CHANGE UP (ref 2–14)
NEUTROPHILS # BLD AUTO: 9.87 K/UL — HIGH (ref 1.8–7.4)
NEUTROPHILS NFR BLD AUTO: 85.1 % — HIGH (ref 43–77)
NRBC # BLD AUTO: 0 K/UL — SIGNIFICANT CHANGE UP (ref 0–0)
NRBC # FLD: 0 K/UL — SIGNIFICANT CHANGE UP (ref 0–0)
NRBC BLD AUTO-RTO: 0 /100 WBCS — SIGNIFICANT CHANGE UP (ref 0–0)
PLATELET # BLD AUTO: 110 K/UL — LOW (ref 150–400)
PMV BLD: 10.5 FL — SIGNIFICANT CHANGE UP (ref 7–13)
POTASSIUM SERPL-MCNC: 4.2 MMOL/L — SIGNIFICANT CHANGE UP (ref 3.5–5)
POTASSIUM SERPL-SCNC: 4.2 MMOL/L — SIGNIFICANT CHANGE UP (ref 3.5–5)
RBC # BLD: 3.42 M/UL — LOW (ref 3.8–5.2)
RBC # FLD: 13.2 % — SIGNIFICANT CHANGE UP (ref 10.3–14.5)
SODIUM SERPL-SCNC: 134 MMOL/L — LOW (ref 135–146)
WBC # BLD: 11.6 K/UL — HIGH (ref 3.8–10.5)
WBC # FLD AUTO: 11.6 K/UL — HIGH (ref 3.8–10.5)

## 2025-07-18 PROCEDURE — 99233 SBSQ HOSP IP/OBS HIGH 50: CPT

## 2025-07-18 RX ADMIN — LOSARTAN POTASSIUM 100 MILLIGRAM(S): 100 TABLET, FILM COATED ORAL at 05:33

## 2025-07-18 RX ADMIN — POLYETHYLENE GLYCOL 3350 17 GRAM(S): 17 POWDER, FOR SOLUTION ORAL at 21:17

## 2025-07-18 RX ADMIN — CILOSTAZOL 50 MILLIGRAM(S): 50 TABLET ORAL at 05:06

## 2025-07-18 RX ADMIN — CELECOXIB 200 MILLIGRAM(S): 50 CAPSULE ORAL at 05:06

## 2025-07-18 RX ADMIN — METOPROLOL SUCCINATE 50 MILLIGRAM(S): 50 TABLET, EXTENDED RELEASE ORAL at 05:06

## 2025-07-18 RX ADMIN — Medication 81 MILLIGRAM(S): at 05:06

## 2025-07-18 RX ADMIN — CELECOXIB 200 MILLIGRAM(S): 50 CAPSULE ORAL at 17:56

## 2025-07-18 RX ADMIN — Medication 650 MILLIGRAM(S): at 05:06

## 2025-07-18 RX ADMIN — CILOSTAZOL 50 MILLIGRAM(S): 50 TABLET ORAL at 17:12

## 2025-07-18 RX ADMIN — ROSUVASTATIN CALCIUM 40 MILLIGRAM(S): 20 TABLET, FILM COATED ORAL at 21:17

## 2025-07-18 RX ADMIN — Medication 40 MILLIGRAM(S): at 17:12

## 2025-07-18 RX ADMIN — Medication 100 MILLIGRAM(S): at 01:42

## 2025-07-18 RX ADMIN — Medication 81 MILLIGRAM(S): at 17:12

## 2025-07-18 RX ADMIN — Medication 40 MILLIGRAM(S): at 05:05

## 2025-07-18 RX ADMIN — Medication 100 MILLIGRAM(S): at 08:07

## 2025-07-18 RX ADMIN — CELECOXIB 200 MILLIGRAM(S): 50 CAPSULE ORAL at 17:12

## 2025-07-18 RX ADMIN — Medication 50 MILLILITER(S): at 21:20

## 2025-07-18 RX ADMIN — Medication 2 TABLET(S): at 21:17

## 2025-07-19 LAB
ANION GAP SERPL CALC-SCNC: 15 MMOL/L — HIGH (ref 7–14)
BASOPHILS # BLD AUTO: 0.02 K/UL — SIGNIFICANT CHANGE UP (ref 0–0.2)
BASOPHILS NFR BLD AUTO: 0.2 % — SIGNIFICANT CHANGE UP (ref 0–2)
BUN SERPL-MCNC: 42 MG/DL — HIGH (ref 10–20)
CALCIUM SERPL-MCNC: 8 MG/DL — LOW (ref 8.4–10.5)
CHLORIDE SERPL-SCNC: 101 MMOL/L — SIGNIFICANT CHANGE UP (ref 98–110)
CO2 SERPL-SCNC: 22 MMOL/L — SIGNIFICANT CHANGE UP (ref 17–32)
CREAT SERPL-MCNC: 1.6 MG/DL — HIGH (ref 0.7–1.5)
EGFR: 31 ML/MIN/1.73M2 — LOW
EGFR: 31 ML/MIN/1.73M2 — LOW
EOSINOPHIL # BLD AUTO: 0.14 K/UL — SIGNIFICANT CHANGE UP (ref 0–0.5)
EOSINOPHIL NFR BLD AUTO: 1.4 % — SIGNIFICANT CHANGE UP (ref 0–6)
GLUCOSE SERPL-MCNC: 94 MG/DL — SIGNIFICANT CHANGE UP (ref 70–99)
HCT VFR BLD CALC: 26.9 % — LOW (ref 34.5–45)
HGB BLD-MCNC: 9.4 G/DL — LOW (ref 11.5–15.5)
IMM GRANULOCYTES # BLD AUTO: 0.06 K/UL — SIGNIFICANT CHANGE UP (ref 0–0.07)
IMM GRANULOCYTES NFR BLD AUTO: 0.6 % — SIGNIFICANT CHANGE UP (ref 0–0.9)
LYMPHOCYTES # BLD AUTO: 1.07 K/UL — SIGNIFICANT CHANGE UP (ref 1–3.3)
LYMPHOCYTES NFR BLD AUTO: 10.8 % — LOW (ref 13–44)
MCHC RBC-ENTMCNC: 30.3 PG — SIGNIFICANT CHANGE UP (ref 27–34)
MCHC RBC-ENTMCNC: 34.9 G/DL — SIGNIFICANT CHANGE UP (ref 32–36)
MCV RBC AUTO: 86.8 FL — SIGNIFICANT CHANGE UP (ref 80–100)
MONOCYTES # BLD AUTO: 0.54 K/UL — SIGNIFICANT CHANGE UP (ref 0–0.9)
MONOCYTES NFR BLD AUTO: 5.5 % — SIGNIFICANT CHANGE UP (ref 2–14)
NEUTROPHILS # BLD AUTO: 8.06 K/UL — HIGH (ref 1.8–7.4)
NEUTROPHILS NFR BLD AUTO: 81.5 % — HIGH (ref 43–77)
NRBC # BLD AUTO: 0 K/UL — SIGNIFICANT CHANGE UP (ref 0–0)
NRBC # FLD: 0 K/UL — SIGNIFICANT CHANGE UP (ref 0–0)
NRBC BLD AUTO-RTO: 0 /100 WBCS — SIGNIFICANT CHANGE UP (ref 0–0)
PLATELET # BLD AUTO: 119 K/UL — LOW (ref 150–400)
PMV BLD: 10.9 FL — SIGNIFICANT CHANGE UP (ref 7–13)
POTASSIUM SERPL-MCNC: 3.7 MMOL/L — SIGNIFICANT CHANGE UP (ref 3.5–5)
POTASSIUM SERPL-SCNC: 3.7 MMOL/L — SIGNIFICANT CHANGE UP (ref 3.5–5)
RBC # BLD: 3.1 M/UL — LOW (ref 3.8–5.2)
RBC # FLD: 13.1 % — SIGNIFICANT CHANGE UP (ref 10.3–14.5)
SODIUM SERPL-SCNC: 138 MMOL/L — SIGNIFICANT CHANGE UP (ref 135–146)
WBC # BLD: 9.89 K/UL — SIGNIFICANT CHANGE UP (ref 3.8–10.5)
WBC # FLD AUTO: 9.89 K/UL — SIGNIFICANT CHANGE UP (ref 3.8–10.5)

## 2025-07-19 PROCEDURE — 99233 SBSQ HOSP IP/OBS HIGH 50: CPT

## 2025-07-19 RX ADMIN — METOPROLOL SUCCINATE 50 MILLIGRAM(S): 50 TABLET, EXTENDED RELEASE ORAL at 05:05

## 2025-07-19 RX ADMIN — Medication 100 MILLILITER(S): at 21:02

## 2025-07-19 RX ADMIN — ROSUVASTATIN CALCIUM 40 MILLIGRAM(S): 20 TABLET, FILM COATED ORAL at 21:02

## 2025-07-19 RX ADMIN — Medication 650 MILLIGRAM(S): at 17:04

## 2025-07-19 RX ADMIN — CELECOXIB 200 MILLIGRAM(S): 50 CAPSULE ORAL at 05:23

## 2025-07-19 RX ADMIN — Medication 650 MILLIGRAM(S): at 14:31

## 2025-07-19 RX ADMIN — CILOSTAZOL 50 MILLIGRAM(S): 50 TABLET ORAL at 05:06

## 2025-07-19 RX ADMIN — Medication 40 MILLIGRAM(S): at 05:05

## 2025-07-19 RX ADMIN — Medication 1 APPLICATION(S): at 11:28

## 2025-07-19 RX ADMIN — CELECOXIB 200 MILLIGRAM(S): 50 CAPSULE ORAL at 17:36

## 2025-07-19 RX ADMIN — Medication 81 MILLIGRAM(S): at 05:06

## 2025-07-19 RX ADMIN — Medication 1000 MILLILITER(S): at 05:06

## 2025-07-19 RX ADMIN — Medication 40 MILLIGRAM(S): at 17:04

## 2025-07-19 RX ADMIN — Medication 650 MILLIGRAM(S): at 11:27

## 2025-07-19 RX ADMIN — CELECOXIB 200 MILLIGRAM(S): 50 CAPSULE ORAL at 17:06

## 2025-07-19 RX ADMIN — Medication 650 MILLIGRAM(S): at 17:34

## 2025-07-19 RX ADMIN — Medication 50 MILLILITER(S): at 11:28

## 2025-07-19 RX ADMIN — Medication 650 MILLIGRAM(S): at 11:57

## 2025-07-19 RX ADMIN — Medication 100 MILLILITER(S): at 12:37

## 2025-07-19 RX ADMIN — Medication 650 MILLIGRAM(S): at 14:01

## 2025-07-19 RX ADMIN — CILOSTAZOL 50 MILLIGRAM(S): 50 TABLET ORAL at 17:04

## 2025-07-19 RX ADMIN — Medication 81 MILLIGRAM(S): at 17:04

## 2025-07-19 RX ADMIN — MAGNESIUM HYDROXIDE 30 MILLILITER(S): 400 SUSPENSION ORAL at 11:26

## 2025-07-19 RX ADMIN — DEXAMETHASONE 2 MILLIGRAM(S): 0.5 TABLET ORAL at 05:09

## 2025-07-19 RX ADMIN — CELECOXIB 200 MILLIGRAM(S): 50 CAPSULE ORAL at 05:05

## 2025-07-19 RX ADMIN — Medication 650 MILLIGRAM(S): at 05:05

## 2025-07-19 RX ADMIN — Medication 650 MILLIGRAM(S): at 05:23

## 2025-07-20 LAB
ANION GAP SERPL CALC-SCNC: 10 MMOL/L — SIGNIFICANT CHANGE UP (ref 7–14)
BASOPHILS # BLD AUTO: 0.01 K/UL — SIGNIFICANT CHANGE UP (ref 0–0.2)
BASOPHILS NFR BLD AUTO: 0.1 % — SIGNIFICANT CHANGE UP (ref 0–2)
BUN SERPL-MCNC: 34 MG/DL — HIGH (ref 10–20)
CALCIUM SERPL-MCNC: 7.5 MG/DL — LOW (ref 8.4–10.5)
CHLORIDE SERPL-SCNC: 107 MMOL/L — SIGNIFICANT CHANGE UP (ref 98–110)
CO2 SERPL-SCNC: 21 MMOL/L — SIGNIFICANT CHANGE UP (ref 17–32)
CREAT SERPL-MCNC: 1 MG/DL — SIGNIFICANT CHANGE UP (ref 0.7–1.5)
EGFR: 54 ML/MIN/1.73M2 — LOW
EGFR: 54 ML/MIN/1.73M2 — LOW
EOSINOPHIL # BLD AUTO: 0.21 K/UL — SIGNIFICANT CHANGE UP (ref 0–0.5)
EOSINOPHIL NFR BLD AUTO: 2.5 % — SIGNIFICANT CHANGE UP (ref 0–6)
GLUCOSE SERPL-MCNC: 155 MG/DL — HIGH (ref 70–99)
HCT VFR BLD CALC: 23 % — LOW (ref 34.5–45)
HGB BLD-MCNC: 7.5 G/DL — LOW (ref 11.5–15.5)
IMM GRANULOCYTES # BLD AUTO: 0.03 K/UL — SIGNIFICANT CHANGE UP (ref 0–0.07)
IMM GRANULOCYTES NFR BLD AUTO: 0.4 % — SIGNIFICANT CHANGE UP (ref 0–0.9)
LYMPHOCYTES # BLD AUTO: 1.58 K/UL — SIGNIFICANT CHANGE UP (ref 1–3.3)
LYMPHOCYTES NFR BLD AUTO: 18.7 % — SIGNIFICANT CHANGE UP (ref 13–44)
MCHC RBC-ENTMCNC: 29 PG — SIGNIFICANT CHANGE UP (ref 27–34)
MCHC RBC-ENTMCNC: 32.6 G/DL — SIGNIFICANT CHANGE UP (ref 32–36)
MCV RBC AUTO: 88.8 FL — SIGNIFICANT CHANGE UP (ref 80–100)
MONOCYTES # BLD AUTO: 0.81 K/UL — SIGNIFICANT CHANGE UP (ref 0–0.9)
MONOCYTES NFR BLD AUTO: 9.6 % — SIGNIFICANT CHANGE UP (ref 2–14)
NEUTROPHILS # BLD AUTO: 5.83 K/UL — SIGNIFICANT CHANGE UP (ref 1.8–7.4)
NEUTROPHILS NFR BLD AUTO: 68.7 % — SIGNIFICANT CHANGE UP (ref 43–77)
NRBC # BLD AUTO: 0 K/UL — SIGNIFICANT CHANGE UP (ref 0–0)
NRBC # FLD: 0 K/UL — SIGNIFICANT CHANGE UP (ref 0–0)
NRBC BLD AUTO-RTO: 0 /100 WBCS — SIGNIFICANT CHANGE UP (ref 0–0)
PLATELET # BLD AUTO: 103 K/UL — LOW (ref 150–400)
PMV BLD: 10.5 FL — SIGNIFICANT CHANGE UP (ref 7–13)
POTASSIUM SERPL-MCNC: 4 MMOL/L — SIGNIFICANT CHANGE UP (ref 3.5–5)
POTASSIUM SERPL-SCNC: 4 MMOL/L — SIGNIFICANT CHANGE UP (ref 3.5–5)
RBC # BLD: 2.59 M/UL — LOW (ref 3.8–5.2)
RBC # FLD: 13.3 % — SIGNIFICANT CHANGE UP (ref 10.3–14.5)
SODIUM SERPL-SCNC: 138 MMOL/L — SIGNIFICANT CHANGE UP (ref 135–146)
WBC # BLD: 8.47 K/UL — SIGNIFICANT CHANGE UP (ref 3.8–10.5)
WBC # FLD AUTO: 8.47 K/UL — SIGNIFICANT CHANGE UP (ref 3.8–10.5)

## 2025-07-20 PROCEDURE — 99233 SBSQ HOSP IP/OBS HIGH 50: CPT

## 2025-07-20 PROCEDURE — 73706 CT ANGIO LWR EXTR W/O&W/DYE: CPT | Mod: 26,LT

## 2025-07-20 RX ADMIN — CILOSTAZOL 50 MILLIGRAM(S): 50 TABLET ORAL at 18:16

## 2025-07-20 RX ADMIN — Medication 40 MILLIGRAM(S): at 05:03

## 2025-07-20 RX ADMIN — Medication 1 APPLICATION(S): at 11:10

## 2025-07-20 RX ADMIN — ROSUVASTATIN CALCIUM 40 MILLIGRAM(S): 20 TABLET, FILM COATED ORAL at 21:11

## 2025-07-20 RX ADMIN — Medication 40 MILLIGRAM(S): at 18:15

## 2025-07-20 RX ADMIN — Medication 650 MILLIGRAM(S): at 18:15

## 2025-07-20 RX ADMIN — METOPROLOL SUCCINATE 50 MILLIGRAM(S): 50 TABLET, EXTENDED RELEASE ORAL at 05:03

## 2025-07-20 RX ADMIN — CILOSTAZOL 50 MILLIGRAM(S): 50 TABLET ORAL at 05:03

## 2025-07-20 RX ADMIN — Medication 650 MILLIGRAM(S): at 11:36

## 2025-07-20 RX ADMIN — Medication 650 MILLIGRAM(S): at 11:06

## 2025-07-20 RX ADMIN — CELECOXIB 200 MILLIGRAM(S): 50 CAPSULE ORAL at 05:33

## 2025-07-20 RX ADMIN — CELECOXIB 200 MILLIGRAM(S): 50 CAPSULE ORAL at 05:03

## 2025-07-20 RX ADMIN — CELECOXIB 200 MILLIGRAM(S): 50 CAPSULE ORAL at 18:48

## 2025-07-20 RX ADMIN — Medication 81 MILLIGRAM(S): at 18:16

## 2025-07-20 RX ADMIN — Medication 100 MILLILITER(S): at 10:41

## 2025-07-20 RX ADMIN — Medication 650 MILLIGRAM(S): at 05:03

## 2025-07-20 RX ADMIN — Medication 650 MILLIGRAM(S): at 05:33

## 2025-07-20 RX ADMIN — Medication 50 MILLILITER(S): at 23:58

## 2025-07-20 RX ADMIN — Medication 81 MILLIGRAM(S): at 05:02

## 2025-07-20 RX ADMIN — Medication 2 TABLET(S): at 21:11

## 2025-07-20 RX ADMIN — CELECOXIB 200 MILLIGRAM(S): 50 CAPSULE ORAL at 18:15

## 2025-07-20 RX ADMIN — Medication 650 MILLIGRAM(S): at 18:47

## 2025-07-21 ENCOUNTER — TRANSCRIPTION ENCOUNTER (OUTPATIENT)
Age: 89
End: 2025-07-21

## 2025-07-21 LAB
BASOPHILS # BLD AUTO: 0.02 K/UL — SIGNIFICANT CHANGE UP (ref 0–0.2)
BASOPHILS NFR BLD AUTO: 0.3 % — SIGNIFICANT CHANGE UP (ref 0–2)
BLD GP AB SCN SERPL QL: SIGNIFICANT CHANGE UP
EOSINOPHIL # BLD AUTO: 0.52 K/UL — HIGH (ref 0–0.5)
EOSINOPHIL NFR BLD AUTO: 7.1 % — HIGH (ref 0–6)
HCT VFR BLD CALC: 24.3 % — LOW (ref 34.5–45)
HGB BLD-MCNC: 8 G/DL — LOW (ref 11.5–15.5)
IMM GRANULOCYTES # BLD AUTO: 0.02 K/UL — SIGNIFICANT CHANGE UP (ref 0–0.07)
IMM GRANULOCYTES NFR BLD AUTO: 0.3 % — SIGNIFICANT CHANGE UP (ref 0–0.9)
LYMPHOCYTES # BLD AUTO: 1.98 K/UL — SIGNIFICANT CHANGE UP (ref 1–3.3)
LYMPHOCYTES NFR BLD AUTO: 26.9 % — SIGNIFICANT CHANGE UP (ref 13–44)
MCHC RBC-ENTMCNC: 29.2 PG — SIGNIFICANT CHANGE UP (ref 27–34)
MCHC RBC-ENTMCNC: 32.9 G/DL — SIGNIFICANT CHANGE UP (ref 32–36)
MCV RBC AUTO: 88.7 FL — SIGNIFICANT CHANGE UP (ref 80–100)
MONOCYTES # BLD AUTO: 0.76 K/UL — SIGNIFICANT CHANGE UP (ref 0–0.9)
MONOCYTES NFR BLD AUTO: 10.3 % — SIGNIFICANT CHANGE UP (ref 2–14)
NEUTROPHILS # BLD AUTO: 4.07 K/UL — SIGNIFICANT CHANGE UP (ref 1.8–7.4)
NEUTROPHILS NFR BLD AUTO: 55.1 % — SIGNIFICANT CHANGE UP (ref 43–77)
NRBC # BLD AUTO: 0 K/UL — SIGNIFICANT CHANGE UP (ref 0–0)
NRBC # FLD: 0 K/UL — SIGNIFICANT CHANGE UP (ref 0–0)
NRBC BLD AUTO-RTO: 0 /100 WBCS — SIGNIFICANT CHANGE UP (ref 0–0)
PLATELET # BLD AUTO: 127 K/UL — LOW (ref 150–400)
PMV BLD: 9.6 FL — SIGNIFICANT CHANGE UP (ref 7–13)
RBC # BLD: 2.74 M/UL — LOW (ref 3.8–5.2)
RBC # FLD: 13.6 % — SIGNIFICANT CHANGE UP (ref 10.3–14.5)
WBC # BLD: 7.37 K/UL — SIGNIFICANT CHANGE UP (ref 3.8–10.5)
WBC # FLD AUTO: 7.37 K/UL — SIGNIFICANT CHANGE UP (ref 3.8–10.5)

## 2025-07-21 PROCEDURE — 99233 SBSQ HOSP IP/OBS HIGH 50: CPT

## 2025-07-21 PROCEDURE — 93970 EXTREMITY STUDY: CPT | Mod: 26

## 2025-07-21 RX ORDER — BISACODYL 5 MG
5 TABLET, DELAYED RELEASE (ENTERIC COATED) ORAL ONCE
Refills: 0 | Status: COMPLETED | OUTPATIENT
Start: 2025-07-21 | End: 2025-07-21

## 2025-07-21 RX ORDER — APIXABAN 5 MG/1
10 TABLET, FILM COATED ORAL
Refills: 0 | Status: DISCONTINUED | OUTPATIENT
Start: 2025-07-21 | End: 2025-07-22

## 2025-07-21 RX ADMIN — Medication 1 APPLICATION(S): at 11:27

## 2025-07-21 RX ADMIN — ROSUVASTATIN CALCIUM 40 MILLIGRAM(S): 20 TABLET, FILM COATED ORAL at 21:06

## 2025-07-21 RX ADMIN — CILOSTAZOL 50 MILLIGRAM(S): 50 TABLET ORAL at 18:48

## 2025-07-21 RX ADMIN — Medication 81 MILLIGRAM(S): at 09:30

## 2025-07-21 RX ADMIN — Medication 5 MILLIGRAM(S): at 11:26

## 2025-07-21 RX ADMIN — CILOSTAZOL 50 MILLIGRAM(S): 50 TABLET ORAL at 09:30

## 2025-07-21 RX ADMIN — Medication 40 MILLIGRAM(S): at 18:48

## 2025-07-21 RX ADMIN — Medication 2 TABLET(S): at 21:06

## 2025-07-21 RX ADMIN — METOPROLOL SUCCINATE 50 MILLIGRAM(S): 50 TABLET, EXTENDED RELEASE ORAL at 05:38

## 2025-07-21 RX ADMIN — Medication 40 MILLIGRAM(S): at 05:38

## 2025-07-21 RX ADMIN — APIXABAN 10 MILLIGRAM(S): 5 TABLET, FILM COATED ORAL at 14:21

## 2025-07-22 ENCOUNTER — TRANSCRIPTION ENCOUNTER (OUTPATIENT)
Age: 89
End: 2025-07-22

## 2025-07-22 VITALS
RESPIRATION RATE: 18 BRPM | OXYGEN SATURATION: 97 % | DIASTOLIC BLOOD PRESSURE: 83 MMHG | HEART RATE: 81 BPM | TEMPERATURE: 98 F | SYSTOLIC BLOOD PRESSURE: 148 MMHG

## 2025-07-22 LAB
BASOPHILS # BLD AUTO: 0.03 K/UL — SIGNIFICANT CHANGE UP (ref 0–0.2)
BASOPHILS NFR BLD AUTO: 0.4 % — SIGNIFICANT CHANGE UP (ref 0–2)
EOSINOPHIL # BLD AUTO: 0.38 K/UL — SIGNIFICANT CHANGE UP (ref 0–0.5)
EOSINOPHIL NFR BLD AUTO: 5 % — SIGNIFICANT CHANGE UP (ref 0–6)
HCT VFR BLD CALC: 28.3 % — LOW (ref 34.5–45)
HGB BLD-MCNC: 9.3 G/DL — LOW (ref 11.5–15.5)
IMM GRANULOCYTES # BLD AUTO: 0.03 K/UL — SIGNIFICANT CHANGE UP (ref 0–0.07)
IMM GRANULOCYTES NFR BLD AUTO: 0.4 % — SIGNIFICANT CHANGE UP (ref 0–0.9)
LYMPHOCYTES # BLD AUTO: 1.33 K/UL — SIGNIFICANT CHANGE UP (ref 1–3.3)
LYMPHOCYTES NFR BLD AUTO: 17.6 % — SIGNIFICANT CHANGE UP (ref 13–44)
MCHC RBC-ENTMCNC: 29.2 PG — SIGNIFICANT CHANGE UP (ref 27–34)
MCHC RBC-ENTMCNC: 32.9 G/DL — SIGNIFICANT CHANGE UP (ref 32–36)
MCV RBC AUTO: 89 FL — SIGNIFICANT CHANGE UP (ref 80–100)
MONOCYTES # BLD AUTO: 0.6 K/UL — SIGNIFICANT CHANGE UP (ref 0–0.9)
MONOCYTES NFR BLD AUTO: 7.9 % — SIGNIFICANT CHANGE UP (ref 2–14)
NEUTROPHILS # BLD AUTO: 5.18 K/UL — SIGNIFICANT CHANGE UP (ref 1.8–7.4)
NEUTROPHILS NFR BLD AUTO: 68.7 % — SIGNIFICANT CHANGE UP (ref 43–77)
NRBC # BLD AUTO: 0 K/UL — SIGNIFICANT CHANGE UP (ref 0–0)
NRBC # FLD: 0 K/UL — SIGNIFICANT CHANGE UP (ref 0–0)
NRBC BLD AUTO-RTO: 0 /100 WBCS — SIGNIFICANT CHANGE UP (ref 0–0)
PLATELET # BLD AUTO: 142 K/UL — LOW (ref 150–400)
PMV BLD: 9.2 FL — SIGNIFICANT CHANGE UP (ref 7–13)
RBC # BLD: 3.18 M/UL — LOW (ref 3.8–5.2)
RBC # FLD: 13.4 % — SIGNIFICANT CHANGE UP (ref 10.3–14.5)
SURGICAL PATHOLOGY STUDY: SIGNIFICANT CHANGE UP
WBC # BLD: 7.55 K/UL — SIGNIFICANT CHANGE UP (ref 3.8–10.5)
WBC # FLD AUTO: 7.55 K/UL — SIGNIFICANT CHANGE UP (ref 3.8–10.5)

## 2025-07-22 PROCEDURE — 99239 HOSP IP/OBS DSCHRG MGMT >30: CPT

## 2025-07-22 RX ORDER — TRAMADOL HYDROCHLORIDE 50 MG/1
1 TABLET, FILM COATED ORAL
Qty: 0 | Refills: 0 | DISCHARGE
Start: 2025-07-22

## 2025-07-22 RX ORDER — POLYETHYLENE GLYCOL 3350 17 G/17G
17 POWDER, FOR SOLUTION ORAL
Qty: 0 | Refills: 0 | DISCHARGE
Start: 2025-07-22

## 2025-07-22 RX ORDER — SENNA 187 MG
2 TABLET ORAL
Qty: 0 | Refills: 0 | DISCHARGE
Start: 2025-07-22

## 2025-07-22 RX ORDER — LOSARTAN POTASSIUM 100 MG/1
0 TABLET, FILM COATED ORAL
Qty: 0 | Refills: 0 | DISCHARGE

## 2025-07-22 RX ORDER — CILOSTAZOL 50 MG/1
0 TABLET ORAL
Qty: 0 | Refills: 0 | DISCHARGE

## 2025-07-22 RX ORDER — APIXABAN 5 MG/1
2 TABLET, FILM COATED ORAL
Qty: 0 | Refills: 0 | DISCHARGE
Start: 2025-07-22

## 2025-07-22 RX ADMIN — CILOSTAZOL 50 MILLIGRAM(S): 50 TABLET ORAL at 05:02

## 2025-07-22 RX ADMIN — METOPROLOL SUCCINATE 50 MILLIGRAM(S): 50 TABLET, EXTENDED RELEASE ORAL at 05:03

## 2025-07-22 RX ADMIN — APIXABAN 10 MILLIGRAM(S): 5 TABLET, FILM COATED ORAL at 05:02

## 2025-07-22 RX ADMIN — Medication 40 MILLIGRAM(S): at 05:02

## 2025-07-30 RX ORDER — APIXABAN 5 MG/1
1 TABLET, FILM COATED ORAL
Qty: 74 | Refills: 0
Start: 2025-07-30

## 2025-07-31 DIAGNOSIS — I82.432 ACUTE EMBOLISM AND THROMBOSIS OF LEFT POPLITEAL VEIN: ICD-10-CM

## 2025-07-31 DIAGNOSIS — D62 ACUTE POSTHEMORRHAGIC ANEMIA: ICD-10-CM

## 2025-07-31 DIAGNOSIS — I10 ESSENTIAL (PRIMARY) HYPERTENSION: ICD-10-CM

## 2025-07-31 DIAGNOSIS — N17.9 ACUTE KIDNEY FAILURE, UNSPECIFIED: ICD-10-CM

## 2025-07-31 DIAGNOSIS — S72.092A OTHER FRACTURE OF HEAD AND NECK OF LEFT FEMUR, INITIAL ENCOUNTER FOR CLOSED FRACTURE: ICD-10-CM

## 2025-07-31 DIAGNOSIS — E78.5 HYPERLIPIDEMIA, UNSPECIFIED: ICD-10-CM

## 2025-07-31 DIAGNOSIS — K92.0 HEMATEMESIS: ICD-10-CM
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
## 2025-07-31 DIAGNOSIS — I73.9 PERIPHERAL VASCULAR DISEASE, UNSPECIFIED: ICD-10-CM

## 2025-08-01 ENCOUNTER — APPOINTMENT (OUTPATIENT)
Dept: ORTHOPEDIC SURGERY | Facility: CLINIC | Age: 89
End: 2025-08-01
Payer: MEDICARE

## 2025-08-01 DIAGNOSIS — S72.002A FRACTURE OF UNSPECIFIED PART OF NECK OF LEFT FEMUR, INITIAL ENCOUNTER FOR CLOSED FRACTURE: ICD-10-CM

## 2025-08-01 DIAGNOSIS — Z96.642 PRESENCE OF LEFT ARTIFICIAL HIP JOINT: ICD-10-CM

## 2025-08-01 PROCEDURE — 99024 POSTOP FOLLOW-UP VISIT: CPT

## 2025-08-04 PROBLEM — Z96.642 HISTORY OF TOTAL LEFT HIP REPLACEMENT: Status: ACTIVE | Noted: 2025-08-04

## 2025-08-04 PROBLEM — S72.002A CLOSED FRACTURE OF NECK OF LEFT FEMUR, INITIAL ENCOUNTER: Status: ACTIVE | Noted: 2025-08-04

## 2025-08-07 ENCOUNTER — APPOINTMENT (OUTPATIENT)
Dept: VASCULAR SURGERY | Facility: CLINIC | Age: 89
End: 2025-08-07
Payer: MEDICARE

## 2025-08-07 VITALS
HEIGHT: 65 IN | BODY MASS INDEX: 25.83 KG/M2 | SYSTOLIC BLOOD PRESSURE: 136 MMHG | DIASTOLIC BLOOD PRESSURE: 73 MMHG | WEIGHT: 155 LBS

## 2025-08-07 DIAGNOSIS — M79.89 PAIN IN LEG, UNSPECIFIED: ICD-10-CM

## 2025-08-07 DIAGNOSIS — M79.606 PAIN IN LEG, UNSPECIFIED: ICD-10-CM

## 2025-08-07 DIAGNOSIS — I82.492 ACUTE EMBOLISM AND THROMBOSIS OF OTHER SPECIFIED DEEP VEIN OF LEFT LOWER EXTREMITY: ICD-10-CM

## 2025-08-07 PROCEDURE — 93971 EXTREMITY STUDY: CPT

## 2025-08-07 PROCEDURE — 99203 OFFICE O/P NEW LOW 30 MIN: CPT

## 2025-08-07 RX ORDER — APIXABAN 5 MG/1
5 TABLET, FILM COATED ORAL
Refills: 0 | Status: ACTIVE | COMMUNITY

## 2025-08-13 ENCOUNTER — NON-APPOINTMENT (OUTPATIENT)
Age: 89
End: 2025-08-13

## 2025-08-22 ENCOUNTER — RESULT REVIEW (OUTPATIENT)
Age: 89
End: 2025-08-22

## 2025-08-25 ENCOUNTER — OUTPATIENT (OUTPATIENT)
Dept: OUTPATIENT SERVICES | Facility: HOSPITAL | Age: 89
LOS: 1 days | End: 2025-08-25
Payer: MEDICARE

## 2025-08-25 DIAGNOSIS — Z98.890 OTHER SPECIFIED POSTPROCEDURAL STATES: Chronic | ICD-10-CM

## 2025-08-25 DIAGNOSIS — M25.552 PAIN IN LEFT HIP: ICD-10-CM

## 2025-08-25 DIAGNOSIS — Z98.41 CATARACT EXTRACTION STATUS, RIGHT EYE: Chronic | ICD-10-CM

## 2025-08-25 PROCEDURE — 73502 X-RAY EXAM HIP UNI 2-3 VIEWS: CPT | Mod: 26,LT

## 2025-08-25 PROCEDURE — 73502 X-RAY EXAM HIP UNI 2-3 VIEWS: CPT | Mod: LT

## 2025-08-26 DIAGNOSIS — M25.552 PAIN IN LEFT HIP: ICD-10-CM

## 2025-09-03 ENCOUNTER — APPOINTMENT (OUTPATIENT)
Dept: ORTHOPEDIC SURGERY | Facility: CLINIC | Age: 89
End: 2025-09-03
Payer: MEDICARE

## 2025-09-03 DIAGNOSIS — S72.002A FRACTURE OF UNSPECIFIED PART OF NECK OF LEFT FEMUR, INITIAL ENCOUNTER FOR CLOSED FRACTURE: ICD-10-CM

## 2025-09-03 DIAGNOSIS — Z96.642 PRESENCE OF LEFT ARTIFICIAL HIP JOINT: ICD-10-CM

## 2025-09-03 PROCEDURE — 99024 POSTOP FOLLOW-UP VISIT: CPT

## 2025-09-03 RX ORDER — APIXABAN 5 MG/1
5 TABLET, FILM COATED ORAL TWICE DAILY
Qty: 60 | Refills: 0 | Status: ACTIVE | COMMUNITY
Start: 2025-09-03 | End: 1900-01-01

## 2025-09-17 ENCOUNTER — APPOINTMENT (OUTPATIENT)
Dept: ORTHOPEDIC SURGERY | Facility: CLINIC | Age: 89
End: 2025-09-17
Payer: MEDICARE

## 2025-09-17 DIAGNOSIS — Z96.642 PRESENCE OF LEFT ARTIFICIAL HIP JOINT: ICD-10-CM

## 2025-09-17 DIAGNOSIS — S72.002A FRACTURE OF UNSPECIFIED PART OF NECK OF LEFT FEMUR, INITIAL ENCOUNTER FOR CLOSED FRACTURE: ICD-10-CM

## 2025-09-17 PROCEDURE — 99024 POSTOP FOLLOW-UP VISIT: CPT

## 2025-09-17 PROCEDURE — 73502 X-RAY EXAM HIP UNI 2-3 VIEWS: CPT
